# Patient Record
Sex: FEMALE | Race: BLACK OR AFRICAN AMERICAN | NOT HISPANIC OR LATINO | Employment: FULL TIME | URBAN - METROPOLITAN AREA
[De-identification: names, ages, dates, MRNs, and addresses within clinical notes are randomized per-mention and may not be internally consistent; named-entity substitution may affect disease eponyms.]

---

## 2023-09-28 LAB
CHLAMYDIA TRACH., AMPLIFIED: NEGATIVE
N. GONORRHEA, AMPLIFIED: NEGATIVE
TRICHOMONAS VAGINALIS: NEGATIVE
URINE CULTURE: NORMAL

## 2023-10-07 ENCOUNTER — HOSPITAL ENCOUNTER (EMERGENCY)
Facility: HOSPITAL | Age: 33
Discharge: HOME | End: 2023-10-07
Payer: COMMERCIAL

## 2023-10-07 ENCOUNTER — APPOINTMENT (OUTPATIENT)
Dept: RADIOLOGY | Facility: HOSPITAL | Age: 33
End: 2023-10-07
Payer: COMMERCIAL

## 2023-10-07 VITALS
BODY MASS INDEX: 27.97 KG/M2 | DIASTOLIC BLOOD PRESSURE: 96 MMHG | SYSTOLIC BLOOD PRESSURE: 142 MMHG | HEIGHT: 62 IN | HEART RATE: 88 BPM | OXYGEN SATURATION: 98 % | TEMPERATURE: 98.4 F | WEIGHT: 152 LBS | RESPIRATION RATE: 18 BRPM

## 2023-10-07 DIAGNOSIS — S92.355A NONDISPLACED FRACTURE OF FIFTH METATARSAL BONE, LEFT FOOT, INITIAL ENCOUNTER FOR CLOSED FRACTURE: Primary | ICD-10-CM

## 2023-10-07 PROCEDURE — 73630 X-RAY EXAM OF FOOT: CPT | Mod: RIGHT SIDE | Performed by: RADIOLOGY

## 2023-10-07 PROCEDURE — 73630 X-RAY EXAM OF FOOT: CPT | Mod: RT,FY

## 2023-10-07 PROCEDURE — 99283 EMERGENCY DEPT VISIT LOW MDM: CPT

## 2023-10-07 RX ORDER — ACETAMINOPHEN 325 MG/1
650 TABLET ORAL ONCE
Status: COMPLETED | OUTPATIENT
Start: 2023-10-07 | End: 2023-10-07

## 2023-10-07 RX ADMIN — ACETAMINOPHEN 650 MG: 325 TABLET ORAL at 12:43

## 2023-10-07 ASSESSMENT — PAIN SCALES - GENERAL: PAINLEVEL_OUTOF10: 7

## 2023-10-07 ASSESSMENT — PAIN - FUNCTIONAL ASSESSMENT: PAIN_FUNCTIONAL_ASSESSMENT: 0-10

## 2023-10-07 ASSESSMENT — COLUMBIA-SUICIDE SEVERITY RATING SCALE - C-SSRS
6. HAVE YOU EVER DONE ANYTHING, STARTED TO DO ANYTHING, OR PREPARED TO DO ANYTHING TO END YOUR LIFE?: NO
1. IN THE PAST MONTH, HAVE YOU WISHED YOU WERE DEAD OR WISHED YOU COULD GO TO SLEEP AND NOT WAKE UP?: NO
2. HAVE YOU ACTUALLY HAD ANY THOUGHTS OF KILLING YOURSELF?: NO

## 2023-10-07 ASSESSMENT — PAIN DESCRIPTION - LOCATION: LOCATION: FOOT

## 2023-10-07 ASSESSMENT — PAIN DESCRIPTION - PAIN TYPE: TYPE: ACUTE PAIN

## 2023-10-07 NOTE — ED PROVIDER NOTES
33-year-old female injured her right foot 2 days ago.  Missed a step causing inversion injury.  Denies ankle pain.  Indicates pain mid distal lateral foot.  Pain with palpation movement and weightbearing.  No other complaints of pain or injury.  Patient is 14 weeks pregnant.  No abdominal pelvic pain.  No vaginal bleeding.  No other concerns of injury.    PMHX: HPI/EMR reviewed past medical history medications allergies.    REVIEW OF SYSTEMS:.  Constitutional: No other complaints of acute illness, No fever, chills  Musculoskeletal: As stated per history of present illness. Otherwise no other extremity pain, joint pain, joint swelling  Neurological: As stated per history of present illness. Otherwise No headache. No Syncope, No acute visual changes. No difficulty swallowing or speaking. No hemiparesis or paresthesias.  Integumentary: As stated per history of present illness, otherwise no open wounds, bleeding, bruising, redness, or rash.   Review of systems is otherwise negative unless stated above or in history of present illness.    Focused exam: Right lower leg  Knee: Uninjured full range of motion nontender  Tibia: No long bone tenderness or deformity.  No soft tissue swelling.  Ankle: No bony tenderness swelling.  No effusion.  Joint stable.  Full range of motion pain.  No pain with weightbearing.  Foot: Tender lateral mid distal foot fourth and fifth metatarsal area.  No deformity.  No medial tenderness.  Other toes are uninjured.  Skin: Intact no erythema or increased warmth.    Vascular: Neurovascular intact distally  Neurological: No motor sensory deficits.    General exam:  Vitals noted. No distress. Afebrile  Constitutional: Well appearing, well nourished, awake, alert, oriented to person, place, time/situation and in no apparent distress.  Cardiovascular:  Blood pressure noted. Normal heart rate  Respiratory: No respiratory distress. Easy unlabored respirations. Speaks easily and clearly in full  sentences  Neurological: Normal mental status, alert and oriented x3, coherent and appropriate, no motor deficits.  Psychiatric: Appropriate mood and affect. Judgement and insight appropriate    MDM: Radiology report reviewed.  Nondisplaced fracture distal fifth metatarsal.  Patient placed in a padded Ace postop shoe and on crutches.  Normal neurovascular exam.  Stable for discharge outpatient management.  Referrals/orthopedics podiatry.  May use Tylenol for pain.  Ice elevate.  Crutches with nonweightbearing.    Disclaimer: This note was dictated by speech recognition. An attempt at proof reading was made to minimize errors. Errors in transcription may be present.  Please call if questions.     Demetrio Justin PA-C  10/07/23 [id number]

## 2023-10-14 PROBLEM — A59.9 TRICHOMONAS INFECTION: Status: ACTIVE | Noted: 2023-10-14

## 2023-10-14 PROBLEM — A59.01 TRICHOMONAS VAGINITIS: Status: ACTIVE | Noted: 2023-10-14

## 2023-10-14 PROBLEM — O09.32 INSUFFICIENT PRENATAL CARE IN SECOND TRIMESTER (HHS-HCC): Status: ACTIVE | Noted: 2023-10-14

## 2023-10-14 PROBLEM — N89.8 VAGINAL DISCHARGE: Status: ACTIVE | Noted: 2023-10-14

## 2023-10-14 PROBLEM — B96.89 BACTERIAL VAGINOSIS: Status: ACTIVE | Noted: 2023-10-14

## 2023-10-14 PROBLEM — O13.9 GESTATIONAL HYPERTENSION (HHS-HCC): Status: ACTIVE | Noted: 2023-10-14

## 2023-10-14 PROBLEM — R30.0 DYSURIA: Status: ACTIVE | Noted: 2023-10-14

## 2023-10-14 PROBLEM — B37.31 YEAST VAGINITIS: Status: ACTIVE | Noted: 2023-10-14

## 2023-10-14 PROBLEM — N76.0 BACTERIAL VAGINOSIS: Status: ACTIVE | Noted: 2023-10-14

## 2023-10-14 RX ORDER — TRIAMCINOLONE ACETONIDE 1 MG/G
CREAM TOPICAL
COMMUNITY
Start: 2017-08-10 | End: 2023-12-04 | Stop reason: WASHOUT

## 2023-10-14 RX ORDER — IBUPROFEN 600 MG/1
TABLET ORAL
COMMUNITY
Start: 2022-06-17 | End: 2023-11-20 | Stop reason: ALTCHOICE

## 2023-10-14 RX ORDER — DESOXIMETASONE 0.5 MG/G
OINTMENT TOPICAL
COMMUNITY
Start: 2017-08-10 | End: 2024-04-04 | Stop reason: ALTCHOICE

## 2023-10-14 RX ORDER — ASPIRIN 81 MG/1
81 TABLET ORAL DAILY
COMMUNITY
End: 2023-11-20 | Stop reason: ALTCHOICE

## 2023-10-14 RX ORDER — ACETAMINOPHEN 325 MG/1
3 TABLET ORAL EVERY 8 HOURS
COMMUNITY
Start: 2022-06-17 | End: 2024-03-12 | Stop reason: HOSPADM

## 2023-10-14 RX ORDER — NIFEDIPINE 30 MG/1
1 TABLET, FILM COATED, EXTENDED RELEASE ORAL DAILY
COMMUNITY
Start: 2022-06-22 | End: 2023-11-20 | Stop reason: ALTCHOICE

## 2023-10-14 RX ORDER — PRENATAL VIT NO.126/IRON/FOLIC 28MG-0.8MG
1 TABLET ORAL DAILY
COMMUNITY
End: 2023-11-20 | Stop reason: SINTOL

## 2023-10-14 RX ORDER — TRAMADOL HYDROCHLORIDE 50 MG/1
TABLET ORAL
COMMUNITY
Start: 2022-06-17 | End: 2023-12-04 | Stop reason: WASHOUT

## 2023-10-14 RX ORDER — ACETAMINOPHEN 500 MG
1000 TABLET ORAL EVERY 6 HOURS
COMMUNITY
Start: 2022-06-24 | End: 2024-03-12 | Stop reason: HOSPADM

## 2023-10-14 RX ORDER — DOCUSATE SODIUM 100 MG/1
CAPSULE, LIQUID FILLED ORAL
COMMUNITY
Start: 2022-06-17 | End: 2024-03-12 | Stop reason: HOSPADM

## 2023-11-13 ENCOUNTER — HOSPITAL ENCOUNTER (OUTPATIENT)
Dept: RADIOLOGY | Facility: HOSPITAL | Age: 33
Discharge: HOME | End: 2023-11-13
Payer: COMMERCIAL

## 2023-11-13 DIAGNOSIS — Z3A.20 20 WEEKS GESTATION OF PREGNANCY (HHS-HCC): ICD-10-CM

## 2023-11-13 PROCEDURE — 76811 OB US DETAILED SNGL FETUS: CPT | Performed by: OBSTETRICS & GYNECOLOGY

## 2023-11-13 PROCEDURE — 76811 OB US DETAILED SNGL FETUS: CPT

## 2023-11-20 ENCOUNTER — LAB (OUTPATIENT)
Dept: LAB | Facility: LAB | Age: 33
End: 2023-11-20
Payer: COMMERCIAL

## 2023-11-20 ENCOUNTER — ROUTINE PRENATAL (OUTPATIENT)
Dept: OBSTETRICS AND GYNECOLOGY | Facility: CLINIC | Age: 33
End: 2023-11-20
Payer: COMMERCIAL

## 2023-11-20 VITALS — WEIGHT: 151 LBS | DIASTOLIC BLOOD PRESSURE: 95 MMHG | BODY MASS INDEX: 27.62 KG/M2 | SYSTOLIC BLOOD PRESSURE: 145 MMHG

## 2023-11-20 DIAGNOSIS — Z34.92 PRENATAL CARE IN SECOND TRIMESTER (HHS-HCC): ICD-10-CM

## 2023-11-20 DIAGNOSIS — O10.919 CHRONIC HYPERTENSION AFFECTING PREGNANCY (HHS-HCC): ICD-10-CM

## 2023-11-20 DIAGNOSIS — N85.A UTERINE SCAR FROM PREVIOUS CESAREAN DELIVERY: ICD-10-CM

## 2023-11-20 DIAGNOSIS — O13.2 GESTATIONAL HYPERTENSION, SECOND TRIMESTER (HHS-HCC): Primary | ICD-10-CM

## 2023-11-20 PROBLEM — B96.89 BACTERIAL VAGINOSIS: Status: RESOLVED | Noted: 2023-10-14 | Resolved: 2023-11-20

## 2023-11-20 PROBLEM — B37.31 YEAST VAGINITIS: Status: RESOLVED | Noted: 2023-10-14 | Resolved: 2023-11-20

## 2023-11-20 PROBLEM — A59.9 TRICHOMONAS INFECTION: Status: RESOLVED | Noted: 2023-10-14 | Resolved: 2023-11-20

## 2023-11-20 PROBLEM — N76.0 BACTERIAL VAGINOSIS: Status: RESOLVED | Noted: 2023-10-14 | Resolved: 2023-11-20

## 2023-11-20 LAB
ABO GROUP (TYPE) IN BLOOD: NORMAL
ALBUMIN SERPL BCP-MCNC: 3.9 G/DL (ref 3.4–5)
ALP SERPL-CCNC: 60 U/L (ref 33–110)
ALT SERPL W P-5'-P-CCNC: 12 U/L (ref 7–45)
ANION GAP SERPL CALC-SCNC: 10 MMOL/L (ref 10–20)
ANTIBODY SCREEN: NORMAL
AST SERPL W P-5'-P-CCNC: 14 U/L (ref 9–39)
BILIRUB SERPL-MCNC: 0.3 MG/DL (ref 0–1.2)
BUN SERPL-MCNC: 6 MG/DL (ref 6–23)
CALCIUM SERPL-MCNC: 8.6 MG/DL (ref 8.6–10.3)
CHLORIDE SERPL-SCNC: 104 MMOL/L (ref 98–107)
CO2 SERPL-SCNC: 24 MMOL/L (ref 21–32)
CREAT SERPL-MCNC: 0.47 MG/DL (ref 0.5–1.05)
CREAT UR-MCNC: 137.5 MG/DL (ref 20–320)
ERYTHROCYTE [DISTWIDTH] IN BLOOD BY AUTOMATED COUNT: 13.3 % (ref 11.5–14.5)
EST. AVERAGE GLUCOSE BLD GHB EST-MCNC: 88 MG/DL
GFR SERPL CREATININE-BSD FRML MDRD: >90 ML/MIN/1.73M*2
GLUCOSE SERPL-MCNC: 72 MG/DL (ref 74–99)
HBA1C MFR BLD: 4.7 %
HBV SURFACE AG SERPL QL IA: NONREACTIVE
HCT VFR BLD AUTO: 37.3 % (ref 36–46)
HCV AB SER QL: NONREACTIVE
HGB BLD-MCNC: 12.5 G/DL (ref 12–16)
HIV 1+2 AB+HIV1 P24 AG SERPL QL IA: NONREACTIVE
MCH RBC QN AUTO: 29.3 PG (ref 26–34)
MCHC RBC AUTO-ENTMCNC: 33.5 G/DL (ref 32–36)
MCV RBC AUTO: 88 FL (ref 80–100)
NRBC BLD-RTO: 0 /100 WBCS (ref 0–0)
PLATELET # BLD AUTO: 265 X10*3/UL (ref 150–450)
POC APPEARANCE, URINE: CLEAR
POC BILIRUBIN, URINE: NEGATIVE
POC BLOOD, URINE: NEGATIVE
POC COLOR, URINE: YELLOW
POC GLUCOSE, URINE: NEGATIVE MG/DL
POC KETONES, URINE: NEGATIVE MG/DL
POC LEUKOCYTES, URINE: NEGATIVE
POC NITRITE,URINE: NEGATIVE
POC PH, URINE: 7.5 PH
POC PROTEIN, URINE: ABNORMAL MG/DL
POC SPECIFIC GRAVITY, URINE: 1.02
POC UROBILINOGEN, URINE: 0.2 EU/DL
POTASSIUM SERPL-SCNC: 3.7 MMOL/L (ref 3.5–5.3)
PROT SERPL-MCNC: 6.8 G/DL (ref 6.4–8.2)
PROT UR-ACNC: 15 MG/DL (ref 5–24)
PROT/CREAT UR: 0.11 MG/MG CREAT (ref 0–0.17)
RBC # BLD AUTO: 4.26 X10*6/UL (ref 4–5.2)
RH FACTOR (ANTIGEN D): NORMAL
RUBV IGG SERPL IA-ACNC: 2.4 IA
RUBV IGG SERPL QL IA: POSITIVE
SODIUM SERPL-SCNC: 134 MMOL/L (ref 136–145)
T PALLIDUM AB SER QL: NONREACTIVE
WBC # BLD AUTO: 11.6 X10*3/UL (ref 4.4–11.3)

## 2023-11-20 PROCEDURE — 86900 BLOOD TYPING SEROLOGIC ABO: CPT

## 2023-11-20 PROCEDURE — 82570 ASSAY OF URINE CREATININE: CPT | Performed by: OBSTETRICS & GYNECOLOGY

## 2023-11-20 PROCEDURE — 86850 RBC ANTIBODY SCREEN: CPT

## 2023-11-20 PROCEDURE — 86780 TREPONEMA PALLIDUM: CPT

## 2023-11-20 PROCEDURE — 87340 HEPATITIS B SURFACE AG IA: CPT

## 2023-11-20 PROCEDURE — 87389 HIV-1 AG W/HIV-1&-2 AB AG IA: CPT

## 2023-11-20 PROCEDURE — 99214 OFFICE O/P EST MOD 30 MIN: CPT | Mod: TH | Performed by: OBSTETRICS & GYNECOLOGY

## 2023-11-20 PROCEDURE — 86317 IMMUNOASSAY INFECTIOUS AGENT: CPT

## 2023-11-20 PROCEDURE — 81329 SMN1 GENE DOS/DELETION ALYS: CPT

## 2023-11-20 PROCEDURE — 36415 COLL VENOUS BLD VENIPUNCTURE: CPT

## 2023-11-20 PROCEDURE — 86901 BLOOD TYPING SEROLOGIC RH(D): CPT

## 2023-11-20 PROCEDURE — 80053 COMPREHEN METABOLIC PANEL: CPT

## 2023-11-20 PROCEDURE — 83036 HEMOGLOBIN GLYCOSYLATED A1C: CPT

## 2023-11-20 PROCEDURE — 85027 COMPLETE CBC AUTOMATED: CPT

## 2023-11-20 PROCEDURE — 81220 CFTR GENE COM VARIANTS: CPT

## 2023-11-20 PROCEDURE — 86803 HEPATITIS C AB TEST: CPT

## 2023-11-20 PROCEDURE — 99214 OFFICE O/P EST MOD 30 MIN: CPT | Performed by: OBSTETRICS & GYNECOLOGY

## 2023-11-20 RX ORDER — PYRIDOXINE HCL (VITAMIN B6) 25 MG
25 TABLET ORAL EVERY 8 HOURS
Qty: 270 TABLET | Refills: 0 | Status: SHIPPED | OUTPATIENT
Start: 2023-11-20 | End: 2024-02-18

## 2023-11-20 RX ORDER — NIFEDIPINE 30 MG/1
30 TABLET, FILM COATED, EXTENDED RELEASE ORAL
Qty: 30 TABLET | Refills: 11 | Status: SHIPPED | OUTPATIENT
Start: 2023-11-20 | End: 2024-03-12 | Stop reason: HOSPADM

## 2023-11-20 RX ORDER — NAPROXEN SODIUM 220 MG/1
162 TABLET, FILM COATED ORAL DAILY
Qty: 180 TABLET | Refills: 3 | Status: SHIPPED | OUTPATIENT
Start: 2023-11-20 | End: 2024-03-12 | Stop reason: HOSPADM

## 2023-11-20 RX ORDER — PRENATAL VIT NO.126/IRON/FOLIC 28MG-0.8MG
1 TABLET ORAL DAILY
Qty: 90 TABLET | Refills: 3 | Status: SHIPPED | OUTPATIENT
Start: 2023-11-20 | End: 2024-04-19 | Stop reason: SDUPTHER

## 2023-11-20 NOTE — PROGRESS NOTES
Needs PNL labs drawn  Suspect CHTN based on elevated BP today and prior hx PP HTN that resolved 2 months postpartum. Initial BP today 160 systolics, repeat 140s.   - Draw baseline labs. Tr prot on UA today.   - Start Nifed 30mg daily  - bASA 162mg daily, only taking consistently the past 2-3 weeks due to NV earlier in the pregnancy  - Home BP cuff already possessed, check BID, goal <140/90  - Serial growths at 28 weeks   - Weekly NST at 32 weeks  Prior CS: wants TOLAC. Recommend [facility] for location given risk for sPEC.  NVP: only now starting to improve, would like B6    Undecided re: contraception.    Irena Alberto MD

## 2023-11-22 ENCOUNTER — APPOINTMENT (OUTPATIENT)
Dept: PODIATRY | Facility: CLINIC | Age: 33
End: 2023-11-22
Payer: COMMERCIAL

## 2023-11-30 LAB
ELECTRONICALLY SIGNED BY: NORMAL
SMA RESULT: NORMAL

## 2023-12-01 LAB
CFTR MUT ANL BLD/T: NORMAL
ELECTRONICALLY SIGNED BY: NORMAL

## 2023-12-04 ENCOUNTER — ROUTINE PRENATAL (OUTPATIENT)
Dept: OBSTETRICS AND GYNECOLOGY | Facility: CLINIC | Age: 33
End: 2023-12-04
Payer: COMMERCIAL

## 2023-12-04 VITALS — DIASTOLIC BLOOD PRESSURE: 91 MMHG | WEIGHT: 154 LBS | SYSTOLIC BLOOD PRESSURE: 138 MMHG | BODY MASS INDEX: 28.17 KG/M2

## 2023-12-04 DIAGNOSIS — O09.892 SUPERVISION OF OTHER HIGH RISK PREGNANCIES, SECOND TRIMESTER (HHS-HCC): Primary | ICD-10-CM

## 2023-12-04 DIAGNOSIS — Z3A.23 23 WEEKS GESTATION OF PREGNANCY (HHS-HCC): ICD-10-CM

## 2023-12-04 PROCEDURE — 99213 OFFICE O/P EST LOW 20 MIN: CPT | Performed by: ADVANCED PRACTICE MIDWIFE

## 2023-12-04 PROCEDURE — 99213 OFFICE O/P EST LOW 20 MIN: CPT | Mod: TH,SB | Performed by: ADVANCED PRACTICE MIDWIFE

## 2023-12-04 ASSESSMENT — ENCOUNTER SYMPTOMS
NEUROLOGICAL NEGATIVE: 0
PSYCHIATRIC NEGATIVE: 0
ALLERGIC/IMMUNOLOGIC NEGATIVE: 0
ENDOCRINE NEGATIVE: 0
EYES NEGATIVE: 0
GASTROINTESTINAL NEGATIVE: 0
CARDIOVASCULAR NEGATIVE: 0
MUSCULOSKELETAL NEGATIVE: 0
RESPIRATORY NEGATIVE: 0
HEMATOLOGIC/LYMPHATIC NEGATIVE: 0
CONSTITUTIONAL NEGATIVE: 0

## 2023-12-04 NOTE — PROGRESS NOTES
Subjective   Patient ID [id number]   Isela Alvarado is a 33 y.o.  at 23w0d with a working estimated date of delivery of 2024, Date entered prior to episode creation who presents for a routine prenatal visit. She denies vaginal bleeding, leakage of fluid, decreased fetal movements, or contractions.    Has been taking Nifedipine 30mg daily x2 weeks, states she is feeling well on it. Home Bps 130s/70s-80s.    Her pregnancy is complicated by:  -cHTN, on Nifed 30mg  -h/o pltcs for NRFHT remote for delivery, considering TOLAC    Objective   Physical Exam  Weight: 69.9 kg (154 lb)  Expected Total Weight Gain: 7 kg (15 lb)-11.5 kg (25 lb)   Pregravid BMI: 28.16  BP: (!) 138/91         Prenatal Labs  Urine dip:  Lab Results   Component Value Date    KETONESU NEGATIVE 2023       Lab Results   Component Value Date    HGB 12.5 2023    HCT 37.3 2023    ABO O 2023    HEPBSAG Nonreactive 2023        Assessment/Plan       Continue prenatal vitamin.  Labs reviewed.  Continue Nifed and home BP monitoring.   Growth ultrasound next week per anatomy US recommendation. Discussed growth US and weekly  surveillance for cHTN on meds.   Discussed timing for delivery with cHTN.   Follow up in 2 weeks for a routine prenatal visit. Discussed physician management only given anti-hypertensives.

## 2023-12-12 ENCOUNTER — ANCILLARY PROCEDURE (OUTPATIENT)
Dept: RADIOLOGY | Facility: CLINIC | Age: 33
End: 2023-12-12
Payer: COMMERCIAL

## 2023-12-12 ENCOUNTER — ANCILLARY ORDERS (OUTPATIENT)
Dept: OBSTETRICS AND GYNECOLOGY | Facility: CLINIC | Age: 33
End: 2023-12-12

## 2023-12-12 DIAGNOSIS — Z32.01 PREGNANCY TEST POSITIVE (HHS-HCC): Primary | ICD-10-CM

## 2023-12-12 DIAGNOSIS — Z32.01 PREGNANCY TEST POSITIVE (HHS-HCC): ICD-10-CM

## 2023-12-12 PROCEDURE — 76816 OB US FOLLOW-UP PER FETUS: CPT | Performed by: OBSTETRICS & GYNECOLOGY

## 2023-12-12 PROCEDURE — 76820 UMBILICAL ARTERY ECHO: CPT | Performed by: OBSTETRICS & GYNECOLOGY

## 2023-12-12 PROCEDURE — 76820 UMBILICAL ARTERY ECHO: CPT

## 2023-12-12 PROCEDURE — 76816 OB US FOLLOW-UP PER FETUS: CPT

## 2023-12-12 PROCEDURE — 76819 FETAL BIOPHYS PROFIL W/O NST: CPT | Performed by: OBSTETRICS & GYNECOLOGY

## 2023-12-19 ENCOUNTER — ROUTINE PRENATAL (OUTPATIENT)
Dept: OBSTETRICS AND GYNECOLOGY | Facility: CLINIC | Age: 33
End: 2023-12-19
Payer: COMMERCIAL

## 2023-12-19 VITALS — SYSTOLIC BLOOD PRESSURE: 110 MMHG | WEIGHT: 158 LBS | BODY MASS INDEX: 28.9 KG/M2 | DIASTOLIC BLOOD PRESSURE: 80 MMHG

## 2023-12-19 DIAGNOSIS — N85.A UTERINE SCAR FROM PREVIOUS CESAREAN DELIVERY: Primary | ICD-10-CM

## 2023-12-19 DIAGNOSIS — O10.919 CHRONIC HYPERTENSION AFFECTING PREGNANCY (HHS-HCC): ICD-10-CM

## 2023-12-19 PROCEDURE — 99213 OFFICE O/P EST LOW 20 MIN: CPT | Performed by: OBSTETRICS & GYNECOLOGY

## 2023-12-19 RX ORDER — FOLIC ACID 0.4 MG
0.4 TABLET ORAL DAILY
COMMUNITY
Start: 2023-09-26

## 2023-12-19 NOTE — PROGRESS NOTES
"Subjective   Patient ID [id number]   Isela Alvarado is a 33 y.o.  at 25w1d with a working estimated date of delivery of 2024, Date entered prior to episode creation who presents for a routine prenatal visit. She denies vaginal bleeding, leakage of fluid, decreased fetal movements, or contractions.    Her pregnancy is complicated by:  Gest htn, FGR    Objective   Physical Exam  Weight: 71.7 kg (158 lb)  Expected Total Weight Gain: 7 kg (15 lb)-11.5 kg (25 lb)   Pregravid BMI: 28.16  BP: 110/80  Fetal Heart Rate: 133 Fundal Height (cm): 25 cm    Prenatal Labs  Urine dip:  Lab Results   Component Value Date    KETONESU NEGATIVE 2023       Lab Results   Component Value Date    HGB 12.5 2023    HCT 37.3 2023    ABO O 2023    HEPBSAG Nonreactive 2023     No results found for: \"PAPPA\", \"AFP\", \"HCG\", \"ESTRIOL\", \"INHBA\"  No results found for: \"GLUF\", \"GLUT1\", \"JRGIBVM4LY\", \"KLWDBUO6OB\"    Imaging  The most recent ultrasound was performed on The most recent ultrasound study is not finalized with a study GA of The most recent ultrasound study is not finalized and EFW of The most recent ultrasound study is not finalized.  The most recent ultrasound study is not finalized  The most recent ultrasound study is not finalized    Assessment/Plan   Problem List Items Addressed This Visit             ICD-10-CM    Uterine scar from previous  delivery - Primary N85.A    Chronic hypertension affecting pregnancy O10.919       Continue prenatal vitamin.  Labs reviewed.  .  Follow up in 2 weeks for a routine prenatal visit.  "

## 2023-12-20 ENCOUNTER — ANCILLARY PROCEDURE (OUTPATIENT)
Dept: RADIOLOGY | Facility: CLINIC | Age: 33
End: 2023-12-20
Payer: COMMERCIAL

## 2023-12-20 ENCOUNTER — APPOINTMENT (OUTPATIENT)
Dept: RADIOLOGY | Facility: HOSPITAL | Age: 33
End: 2023-12-20
Payer: COMMERCIAL

## 2023-12-20 ENCOUNTER — HOSPITAL ENCOUNTER (EMERGENCY)
Facility: HOSPITAL | Age: 33
Discharge: HOME | End: 2023-12-20
Payer: COMMERCIAL

## 2023-12-20 ENCOUNTER — APPOINTMENT (OUTPATIENT)
Dept: RADIOLOGY | Facility: CLINIC | Age: 33
End: 2023-12-20
Payer: COMMERCIAL

## 2023-12-20 VITALS
HEART RATE: 101 BPM | RESPIRATION RATE: 17 BRPM | TEMPERATURE: 97.8 F | BODY MASS INDEX: 29.26 KG/M2 | SYSTOLIC BLOOD PRESSURE: 143 MMHG | WEIGHT: 159 LBS | DIASTOLIC BLOOD PRESSURE: 98 MMHG | OXYGEN SATURATION: 99 % | HEIGHT: 62 IN

## 2023-12-20 DIAGNOSIS — Z32.01 PREGNANCY TEST POSITIVE (HHS-HCC): ICD-10-CM

## 2023-12-20 DIAGNOSIS — E04.1 THYROID NODULE: Primary | ICD-10-CM

## 2023-12-20 DIAGNOSIS — R07.89 PAIN, CHEST WALL: ICD-10-CM

## 2023-12-20 DIAGNOSIS — O09.90 SUPERVISION OF HIGH RISK PREGNANCY, UNSPECIFIED, UNSPECIFIED TRIMESTER (HHS-HCC): ICD-10-CM

## 2023-12-20 DIAGNOSIS — M62.838 NECK MUSCLE SPASM: ICD-10-CM

## 2023-12-20 PROCEDURE — 71045 X-RAY EXAM CHEST 1 VIEW: CPT | Performed by: STUDENT IN AN ORGANIZED HEALTH CARE EDUCATION/TRAINING PROGRAM

## 2023-12-20 PROCEDURE — 76820 UMBILICAL ARTERY ECHO: CPT | Performed by: OBSTETRICS & GYNECOLOGY

## 2023-12-20 PROCEDURE — 76819 FETAL BIOPHYS PROFIL W/O NST: CPT | Performed by: OBSTETRICS & GYNECOLOGY

## 2023-12-20 PROCEDURE — 76820 UMBILICAL ARTERY ECHO: CPT

## 2023-12-20 PROCEDURE — 76819 FETAL BIOPHYS PROFIL W/O NST: CPT

## 2023-12-20 PROCEDURE — 99284 EMERGENCY DEPT VISIT MOD MDM: CPT | Mod: 25 | Performed by: NURSE PRACTITIONER

## 2023-12-20 PROCEDURE — 72125 CT NECK SPINE W/O DYE: CPT | Performed by: STUDENT IN AN ORGANIZED HEALTH CARE EDUCATION/TRAINING PROGRAM

## 2023-12-20 PROCEDURE — 2500000005 HC RX 250 GENERAL PHARMACY W/O HCPCS: Performed by: NURSE PRACTITIONER

## 2023-12-20 PROCEDURE — 99284 EMERGENCY DEPT VISIT MOD MDM: CPT

## 2023-12-20 PROCEDURE — 71045 X-RAY EXAM CHEST 1 VIEW: CPT

## 2023-12-20 PROCEDURE — 72125 CT NECK SPINE W/O DYE: CPT

## 2023-12-20 RX ORDER — LIDOCAINE 560 MG/1
1 PATCH PERCUTANEOUS; TOPICAL; TRANSDERMAL ONCE
Status: DISCONTINUED | OUTPATIENT
Start: 2023-12-20 | End: 2023-12-20 | Stop reason: HOSPADM

## 2023-12-20 RX ORDER — LIDOCAINE 50 MG/G
1 PATCH TOPICAL DAILY
Qty: 7 PATCH | Refills: 0 | Status: SHIPPED | OUTPATIENT
Start: 2023-12-20 | End: 2023-12-27

## 2023-12-20 RX ORDER — ACETAMINOPHEN 500 MG
1000 TABLET ORAL 2 TIMES DAILY
Qty: 28 TABLET | Refills: 0 | Status: SHIPPED | OUTPATIENT
Start: 2023-12-20 | End: 2023-12-27

## 2023-12-20 RX ORDER — ACETAMINOPHEN 325 MG/1
TABLET ORAL
Status: COMPLETED
Start: 2023-12-20 | End: 2023-12-20

## 2023-12-20 RX ORDER — ACETAMINOPHEN 325 MG/1
975 TABLET ORAL ONCE
Status: COMPLETED | OUTPATIENT
Start: 2023-12-20 | End: 2023-12-20

## 2023-12-20 RX ADMIN — LIDOCAINE 1 PATCH: 4 PATCH TOPICAL at 19:03

## 2023-12-20 RX ADMIN — ACETAMINOPHEN 975 MG: 325 TABLET ORAL at 19:03

## 2023-12-20 ASSESSMENT — COLUMBIA-SUICIDE SEVERITY RATING SCALE - C-SSRS
2. HAVE YOU ACTUALLY HAD ANY THOUGHTS OF KILLING YOURSELF?: NO
1. IN THE PAST MONTH, HAVE YOU WISHED YOU WERE DEAD OR WISHED YOU COULD GO TO SLEEP AND NOT WAKE UP?: NO
6. HAVE YOU EVER DONE ANYTHING, STARTED TO DO ANYTHING, OR PREPARED TO DO ANYTHING TO END YOUR LIFE?: NO

## 2023-12-20 ASSESSMENT — PAIN SCALES - GENERAL: PAINLEVEL_OUTOF10: 9

## 2023-12-20 ASSESSMENT — PAIN - FUNCTIONAL ASSESSMENT: PAIN_FUNCTIONAL_ASSESSMENT: 0-10

## 2023-12-20 ASSESSMENT — PAIN DESCRIPTION - LOCATION: LOCATION: NECK
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
## 2023-12-21 PROBLEM — Z36.4 ULTRASOUND FOR ANTENATAL SCREENING FOR FETAL GROWTH RESTRICTION (HHS-HCC): Status: ACTIVE | Noted: 2023-12-21

## 2023-12-26 ENCOUNTER — ANCILLARY PROCEDURE (OUTPATIENT)
Dept: RADIOLOGY | Facility: CLINIC | Age: 33
End: 2023-12-26
Payer: COMMERCIAL

## 2023-12-26 DIAGNOSIS — O36.5990 IUGR (INTRAUTERINE GROWTH RESTRICTION) AFFECTING CARE OF MOTHER (HHS-HCC): ICD-10-CM

## 2023-12-26 DIAGNOSIS — Z32.01 PREGNANCY TEST POSITIVE (HHS-HCC): ICD-10-CM

## 2023-12-26 PROCEDURE — 76820 UMBILICAL ARTERY ECHO: CPT | Performed by: OBSTETRICS & GYNECOLOGY

## 2023-12-26 PROCEDURE — 76819 FETAL BIOPHYS PROFIL W/O NST: CPT | Performed by: OBSTETRICS & GYNECOLOGY

## 2023-12-26 PROCEDURE — 76820 UMBILICAL ARTERY ECHO: CPT

## 2023-12-26 PROCEDURE — 76819 FETAL BIOPHYS PROFIL W/O NST: CPT

## 2024-01-02 ENCOUNTER — TELEPHONE (OUTPATIENT)
Dept: OBSTETRICS AND GYNECOLOGY | Facility: CLINIC | Age: 34
End: 2024-01-02

## 2024-01-02 ENCOUNTER — ROUTINE PRENATAL (OUTPATIENT)
Dept: OBSTETRICS AND GYNECOLOGY | Facility: CLINIC | Age: 34
End: 2024-01-02
Payer: COMMERCIAL

## 2024-01-02 ENCOUNTER — LAB (OUTPATIENT)
Dept: LAB | Facility: LAB | Age: 34
End: 2024-01-02
Payer: COMMERCIAL

## 2024-01-02 VITALS — BODY MASS INDEX: 29.62 KG/M2 | DIASTOLIC BLOOD PRESSURE: 90 MMHG | WEIGHT: 163 LBS | SYSTOLIC BLOOD PRESSURE: 142 MMHG

## 2024-01-02 DIAGNOSIS — Z3A.27 27 WEEKS GESTATION OF PREGNANCY (HHS-HCC): ICD-10-CM

## 2024-01-02 DIAGNOSIS — Z36.4 ULTRASOUND FOR ANTENATAL SCREENING FOR FETAL GROWTH RESTRICTION (HHS-HCC): ICD-10-CM

## 2024-01-02 DIAGNOSIS — O10.919 CHRONIC HYPERTENSION AFFECTING PREGNANCY (HHS-HCC): Primary | ICD-10-CM

## 2024-01-02 DIAGNOSIS — O10.919 CHRONIC HYPERTENSION AFFECTING PREGNANCY (HHS-HCC): ICD-10-CM

## 2024-01-02 LAB
ERYTHROCYTE [DISTWIDTH] IN BLOOD BY AUTOMATED COUNT: 13.2 % (ref 11.5–14.5)
GLUCOSE 1H P 50 G GLC PO SERPL-MCNC: 134 MG/DL
HCT VFR BLD AUTO: 36.8 % (ref 36–46)
HGB BLD-MCNC: 12.3 G/DL (ref 12–16)
MCH RBC QN AUTO: 29.8 PG (ref 26–34)
MCHC RBC AUTO-ENTMCNC: 33.4 G/DL (ref 32–36)
MCV RBC AUTO: 89 FL (ref 80–100)
NRBC BLD-RTO: 0 /100 WBCS (ref 0–0)
PLATELET # BLD AUTO: 269 X10*3/UL (ref 150–450)
RBC # BLD AUTO: 4.13 X10*6/UL (ref 4–5.2)
T PALLIDUM AB SER QL: NONREACTIVE
WBC # BLD AUTO: 12.3 X10*3/UL (ref 4.4–11.3)

## 2024-01-02 PROCEDURE — 86780 TREPONEMA PALLIDUM: CPT

## 2024-01-02 PROCEDURE — 85027 COMPLETE CBC AUTOMATED: CPT

## 2024-01-02 PROCEDURE — 99213 OFFICE O/P EST LOW 20 MIN: CPT | Performed by: OBSTETRICS & GYNECOLOGY

## 2024-01-02 PROCEDURE — 36415 COLL VENOUS BLD VENIPUNCTURE: CPT

## 2024-01-02 PROCEDURE — 82947 ASSAY GLUCOSE BLOOD QUANT: CPT

## 2024-01-02 NOTE — PROGRESS NOTES
"Subjective   Patient ID [id number]   Isela Alvarado is a 33 y.o.  at 27w1d with a working estimated date of delivery of 2024, Date entered prior to episode creation who presents for a routine prenatal visit. She denies vaginal bleeding, leakage of fluid, decreased fetal movements, or contractions.    Her pregnancy is complicated by:  Chronic htn, FGR    Objective   Physical Exam:   Weight: 73.9 kg (163 lb)  Expected Total Weight Gain: 7 kg (15 lb)-11.5 kg (25 lb)   Pregravid BMI: 27.98  BP: 142/90  Fetal Heart Rate: 128 Fundal Height (cm): 28 cm             Prenatal Labs  Urine Dip:  Lab Results   Component Value Date    KETONESU NEGATIVE 2023     Lab Results   Component Value Date    HGB 12.5 2023    HCT 37.3 2023    ABO O 2023    HEPBSAG Nonreactive 2023     No results found for: \"PAPPA\", \"AFP\", \"HCG\", \"ESTRIOL\", \"INHBA\"  No results found for: \"GLUF\", \"GLUT1\", \"NATWTWT0OG\", \"[id number]\"    Imaging  The most recent ultrasound was performed on   with a study GA of   and EFW of  .          Assessment/Plan   Problem List Items Addressed This Visit             ICD-10-CM    Chronic hypertension affecting pregnancy - Primary O10.919    Relevant Orders    CBC    Glucose, 1 Hour Screen, Pregnancy    Syphilis Screen with Reflex    Ultrasound for  screening for fetal growth restriction Z36.4    Relevant Orders    CBC    Glucose, 1 Hour Screen, Pregnancy    Syphilis Screen with Reflex     Other Visit Diagnoses         Codes    27 weeks gestation of pregnancy     Z3A.27    Relevant Orders    CBC    Glucose, 1 Hour Screen, Pregnancy    Syphilis Screen with Reflex          Continue prenatal vitamin.  Labs reviewed.  GBS taken.  Expected mode of delivery vag  Follow up in 1 week for a routine prenatal visit.  "

## 2024-01-03 ENCOUNTER — APPOINTMENT (OUTPATIENT)
Dept: RADIOLOGY | Facility: CLINIC | Age: 34
End: 2024-01-03
Payer: COMMERCIAL

## 2024-01-03 ENCOUNTER — TELEPHONE (OUTPATIENT)
Dept: OBSTETRICS AND GYNECOLOGY | Facility: CLINIC | Age: 34
End: 2024-01-03
Payer: COMMERCIAL

## 2024-01-03 NOTE — TELEPHONE ENCOUNTER
Pt verified by name and .  Pt calling states she is pregnant.  Saw Leelee Morrow back in September was told something about getting a pack and play can car seat.  Pt advised to keep her appt with Dr. Alberto.  Nurse gave pt number for [facility]  Pt has no questions at this time.

## 2024-01-04 ENCOUNTER — ANCILLARY PROCEDURE (OUTPATIENT)
Dept: RADIOLOGY | Facility: CLINIC | Age: 34
End: 2024-01-04
Payer: COMMERCIAL

## 2024-01-04 DIAGNOSIS — Z32.01 PREGNANCY TEST POSITIVE (HHS-HCC): ICD-10-CM

## 2024-01-04 PROCEDURE — 76819 FETAL BIOPHYS PROFIL W/O NST: CPT | Performed by: OBSTETRICS & GYNECOLOGY

## 2024-01-04 PROCEDURE — 76819 FETAL BIOPHYS PROFIL W/O NST: CPT

## 2024-01-04 PROCEDURE — 76820 UMBILICAL ARTERY ECHO: CPT | Performed by: OBSTETRICS & GYNECOLOGY

## 2024-01-04 PROCEDURE — 76816 OB US FOLLOW-UP PER FETUS: CPT

## 2024-01-04 PROCEDURE — 76816 OB US FOLLOW-UP PER FETUS: CPT | Performed by: OBSTETRICS & GYNECOLOGY

## 2024-01-05 ENCOUNTER — APPOINTMENT (OUTPATIENT)
Dept: OTOLARYNGOLOGY | Facility: CLINIC | Age: 34
End: 2024-01-05
Payer: COMMERCIAL

## 2024-01-15 ENCOUNTER — ROUTINE PRENATAL (OUTPATIENT)
Dept: OBSTETRICS AND GYNECOLOGY | Facility: CLINIC | Age: 34
End: 2024-01-15
Payer: COMMERCIAL

## 2024-01-15 ENCOUNTER — APPOINTMENT (OUTPATIENT)
Dept: OTOLARYNGOLOGY | Facility: CLINIC | Age: 34
End: 2024-01-15
Payer: COMMERCIAL

## 2024-01-15 VITALS — SYSTOLIC BLOOD PRESSURE: 137 MMHG | BODY MASS INDEX: 30.16 KG/M2 | WEIGHT: 166 LBS | DIASTOLIC BLOOD PRESSURE: 86 MMHG

## 2024-01-15 DIAGNOSIS — Z36.4 ULTRASOUND FOR ANTENATAL SCREENING FOR FETAL GROWTH RESTRICTION (HHS-HCC): ICD-10-CM

## 2024-01-15 DIAGNOSIS — N85.A UTERINE SCAR FROM PREVIOUS CESAREAN DELIVERY: Primary | ICD-10-CM

## 2024-01-15 DIAGNOSIS — O10.919 CHRONIC HYPERTENSION AFFECTING PREGNANCY (HHS-HCC): ICD-10-CM

## 2024-01-15 PROCEDURE — 99214 OFFICE O/P EST MOD 30 MIN: CPT | Mod: TH | Performed by: OBSTETRICS & GYNECOLOGY

## 2024-01-15 PROCEDURE — 99214 OFFICE O/P EST MOD 30 MIN: CPT | Performed by: OBSTETRICS & GYNECOLOGY

## 2024-01-15 NOTE — PROGRESS NOTES
Tdap offered. Pt states she had an allergic rxn to the Tdap prior. She will plan to have baby vaccinated.  2nd tri labs passed.  CHTN: BP at goal on Nifed 30mg daily. Plan for del at 38 weeks. For serial growths (next 1/26) and weekly NSTs after 32 weeks.  FGR on 12/12 US (AC 2%, EFW 3%): Nl dopplers thus far. Nl growth on 1/4/24 at EFW 13% and AC 17%. Pt reports next US is at 30 weeks on 1/26.  Prior CS: wants TOLAC, score > 50%.    Needs # for [facility] as [facility] didn't reach out to her yet for pack n play and car seat. Did get breast pump from [employer].     Irena Alberto MD

## 2024-01-26 ENCOUNTER — HOSPITAL ENCOUNTER (OUTPATIENT)
Dept: RADIOLOGY | Facility: CLINIC | Age: 34
Discharge: HOME | End: 2024-01-26
Payer: COMMERCIAL

## 2024-01-26 DIAGNOSIS — Z32.01 PREGNANCY TEST POSITIVE (HHS-HCC): ICD-10-CM

## 2024-01-26 PROCEDURE — 76820 UMBILICAL ARTERY ECHO: CPT | Performed by: OBSTETRICS & GYNECOLOGY

## 2024-01-26 PROCEDURE — 76819 FETAL BIOPHYS PROFIL W/O NST: CPT | Performed by: OBSTETRICS & GYNECOLOGY

## 2024-01-26 PROCEDURE — 76819 FETAL BIOPHYS PROFIL W/O NST: CPT

## 2024-01-26 PROCEDURE — 76816 OB US FOLLOW-UP PER FETUS: CPT

## 2024-01-26 PROCEDURE — 76820 UMBILICAL ARTERY ECHO: CPT

## 2024-01-26 PROCEDURE — 76816 OB US FOLLOW-UP PER FETUS: CPT | Performed by: OBSTETRICS & GYNECOLOGY

## 2024-02-01 ENCOUNTER — APPOINTMENT (OUTPATIENT)
Dept: OBSTETRICS AND GYNECOLOGY | Facility: CLINIC | Age: 34
End: 2024-02-01
Payer: COMMERCIAL

## 2024-02-06 ENCOUNTER — APPOINTMENT (OUTPATIENT)
Dept: OBSTETRICS AND GYNECOLOGY | Facility: CLINIC | Age: 34
End: 2024-02-06
Payer: COMMERCIAL

## 2024-02-06 ENCOUNTER — HOSPITAL ENCOUNTER (OUTPATIENT)
Dept: RADIOLOGY | Facility: CLINIC | Age: 34
Discharge: HOME | End: 2024-02-06
Payer: COMMERCIAL

## 2024-02-06 DIAGNOSIS — Z32.01 PREGNANCY TEST POSITIVE (HHS-HCC): ICD-10-CM

## 2024-02-06 DIAGNOSIS — O36.5930 MATERNAL CARE FOR OTHER KNOWN OR SUSPECTED POOR FETAL GROWTH, THIRD TRIMESTER, NOT APPLICABLE OR UNSPECIFIED (HHS-HCC): ICD-10-CM

## 2024-02-06 PROCEDURE — 76820 UMBILICAL ARTERY ECHO: CPT

## 2024-02-06 PROCEDURE — 76819 FETAL BIOPHYS PROFIL W/O NST: CPT

## 2024-02-06 PROCEDURE — 76820 UMBILICAL ARTERY ECHO: CPT | Performed by: OBSTETRICS & GYNECOLOGY

## 2024-02-06 PROCEDURE — 76819 FETAL BIOPHYS PROFIL W/O NST: CPT | Performed by: OBSTETRICS & GYNECOLOGY

## 2024-02-07 ENCOUNTER — TELEPHONE (OUTPATIENT)
Dept: RADIOLOGY | Facility: CLINIC | Age: 34
End: 2024-02-07
Payer: COMMERCIAL

## 2024-02-07 NOTE — TELEPHONE ENCOUNTER
Called and reminded her of her upcoming appointments for NST and with Dr. Negron. Patient said she was already aware and knows the baby is measuring small and will be at all of her appointments.

## 2024-02-15 ENCOUNTER — ROUTINE PRENATAL (OUTPATIENT)
Dept: OBSTETRICS AND GYNECOLOGY | Facility: CLINIC | Age: 34
End: 2024-02-15
Payer: COMMERCIAL

## 2024-02-15 ENCOUNTER — APPOINTMENT (OUTPATIENT)
Dept: OBSTETRICS AND GYNECOLOGY | Facility: CLINIC | Age: 34
End: 2024-02-15
Payer: COMMERCIAL

## 2024-02-15 VITALS — SYSTOLIC BLOOD PRESSURE: 138 MMHG | DIASTOLIC BLOOD PRESSURE: 92 MMHG | WEIGHT: 173 LBS | BODY MASS INDEX: 31.43 KG/M2

## 2024-02-15 DIAGNOSIS — O36.5990 FETAL GROWTH RESTRICTION ANTEPARTUM (HHS-HCC): Primary | ICD-10-CM

## 2024-02-15 DIAGNOSIS — O10.919 CHRONIC HYPERTENSION AFFECTING PREGNANCY (HHS-HCC): ICD-10-CM

## 2024-02-15 DIAGNOSIS — Z3A.33 33 WEEKS GESTATION OF PREGNANCY (HHS-HCC): ICD-10-CM

## 2024-02-15 DIAGNOSIS — N85.A UTERINE SCAR FROM PREVIOUS CESAREAN DELIVERY: ICD-10-CM

## 2024-02-15 PROCEDURE — 99213 OFFICE O/P EST LOW 20 MIN: CPT | Performed by: OBSTETRICS & GYNECOLOGY

## 2024-02-15 NOTE — PROGRESS NOTES
Subjective   Patient ID [id number]   Isela Alvarado is a 33 y.o.  at 33w3d with a working estimated date of delivery of 2024, Date entered prior to episode creation who presents for a routine prenatal visit. She denies vaginal bleeding, leakage of fluid, decreased fetal movements, or contractions.    Her pregnancy is complicated by:  Fgr, gest htn    Objective   Physical Exam:   Weight: 78.5 kg (173 lb)  Expected Total Weight Gain: 7 kg (15 lb)-11.5 kg (25 lb)   Pregravid BMI: 27.98  BP: (!) 138/92  Fetal Heart Rate: 128 Fundal Height (cm): 32 cm             Prenatal Labs  Urine Dip:  Lab Results   Component Value Date    KETONESU NEGATIVE 2023     Lab Results   Component Value Date    HGB 12.3 2024    HCT 36.8 2024    ABO O 2023    HEPBSAG Nonreactive 2023       Assessment/Plan   Problem List Items Addressed This Visit             ICD-10-CM    Uterine scar from previous  delivery N85.A    Chronic hypertension affecting pregnancy O10.919    Ultrasound for  screening for fetal growth restriction - Primary Z36.4     Other Visit Diagnoses         Codes    33 weeks gestation of pregnancy     Z3A.33          Continue prenatal vitamin.  Labs reviewed.  GBS taken.  Expected mode of delivery vag  Follow up in 1 week for a routine prenatal visit.  Denies ha, bv, or spots

## 2024-02-20 ENCOUNTER — APPOINTMENT (OUTPATIENT)
Dept: OBSTETRICS AND GYNECOLOGY | Facility: CLINIC | Age: 34
End: 2024-02-20
Payer: COMMERCIAL

## 2024-02-22 ENCOUNTER — HOSPITAL ENCOUNTER (OUTPATIENT)
Dept: RADIOLOGY | Facility: CLINIC | Age: 34
Discharge: HOME | End: 2024-02-22
Payer: COMMERCIAL

## 2024-02-22 DIAGNOSIS — Z32.01 PREGNANCY TEST POSITIVE (HHS-HCC): ICD-10-CM

## 2024-02-22 PROCEDURE — 76819 FETAL BIOPHYS PROFIL W/O NST: CPT | Performed by: STUDENT IN AN ORGANIZED HEALTH CARE EDUCATION/TRAINING PROGRAM

## 2024-02-22 PROCEDURE — 76819 FETAL BIOPHYS PROFIL W/O NST: CPT

## 2024-02-22 PROCEDURE — 76820 UMBILICAL ARTERY ECHO: CPT | Performed by: STUDENT IN AN ORGANIZED HEALTH CARE EDUCATION/TRAINING PROGRAM

## 2024-02-22 PROCEDURE — 76816 OB US FOLLOW-UP PER FETUS: CPT

## 2024-02-22 PROCEDURE — 76816 OB US FOLLOW-UP PER FETUS: CPT | Performed by: STUDENT IN AN ORGANIZED HEALTH CARE EDUCATION/TRAINING PROGRAM

## 2024-02-22 PROCEDURE — 76820 UMBILICAL ARTERY ECHO: CPT

## 2024-02-27 ENCOUNTER — APPOINTMENT (OUTPATIENT)
Dept: OBSTETRICS AND GYNECOLOGY | Facility: CLINIC | Age: 34
End: 2024-02-27
Payer: COMMERCIAL

## 2024-02-29 ENCOUNTER — APPOINTMENT (OUTPATIENT)
Dept: OBSTETRICS AND GYNECOLOGY | Facility: CLINIC | Age: 34
End: 2024-02-29
Payer: COMMERCIAL

## 2024-02-29 ENCOUNTER — HOSPITAL ENCOUNTER (OUTPATIENT)
Dept: RADIOLOGY | Facility: CLINIC | Age: 34
Discharge: HOME | End: 2024-02-29
Payer: COMMERCIAL

## 2024-02-29 DIAGNOSIS — O36.5990 IUGR (INTRAUTERINE GROWTH RESTRICTION) AFFECTING CARE OF MOTHER (HHS-HCC): ICD-10-CM

## 2024-02-29 DIAGNOSIS — Z32.01 PREGNANCY TEST POSITIVE (HHS-HCC): ICD-10-CM

## 2024-02-29 PROCEDURE — 76820 UMBILICAL ARTERY ECHO: CPT | Performed by: STUDENT IN AN ORGANIZED HEALTH CARE EDUCATION/TRAINING PROGRAM

## 2024-02-29 PROCEDURE — 76819 FETAL BIOPHYS PROFIL W/O NST: CPT | Performed by: STUDENT IN AN ORGANIZED HEALTH CARE EDUCATION/TRAINING PROGRAM

## 2024-02-29 PROCEDURE — 76819 FETAL BIOPHYS PROFIL W/O NST: CPT

## 2024-02-29 PROCEDURE — 76820 UMBILICAL ARTERY ECHO: CPT

## 2024-03-07 ENCOUNTER — APPOINTMENT (OUTPATIENT)
Dept: RADIOLOGY | Facility: HOSPITAL | Age: 34
End: 2024-03-07
Payer: COMMERCIAL

## 2024-03-07 ENCOUNTER — ROUTINE PRENATAL (OUTPATIENT)
Dept: OBSTETRICS AND GYNECOLOGY | Facility: CLINIC | Age: 34
End: 2024-03-07
Payer: COMMERCIAL

## 2024-03-07 VITALS — DIASTOLIC BLOOD PRESSURE: 90 MMHG | WEIGHT: 183.6 LBS | BODY MASS INDEX: 33.36 KG/M2 | SYSTOLIC BLOOD PRESSURE: 140 MMHG

## 2024-03-07 DIAGNOSIS — O10.919 CHRONIC HYPERTENSION AFFECTING PREGNANCY (HHS-HCC): ICD-10-CM

## 2024-03-07 DIAGNOSIS — N85.A UTERINE SCAR FROM PREVIOUS CESAREAN DELIVERY: Primary | ICD-10-CM

## 2024-03-07 DIAGNOSIS — O36.5990 FETAL GROWTH RESTRICTION ANTEPARTUM (HHS-HCC): ICD-10-CM

## 2024-03-07 DIAGNOSIS — Z3A.36 36 WEEKS GESTATION OF PREGNANCY (HHS-HCC): ICD-10-CM

## 2024-03-07 DIAGNOSIS — Z36.4 ULTRASOUND FOR ANTENATAL SCREENING FOR FETAL GROWTH RESTRICTION (HHS-HCC): ICD-10-CM

## 2024-03-07 PROCEDURE — 99213 OFFICE O/P EST LOW 20 MIN: CPT | Performed by: OBSTETRICS & GYNECOLOGY

## 2024-03-07 PROCEDURE — 87081 CULTURE SCREEN ONLY: CPT

## 2024-03-07 NOTE — PROGRESS NOTES
Subjective   Patient ID [id number]   Isela Alvarado is a 33 y.o.  at 36w3d with a working estimated date of delivery of 2024, Date entered prior to episode creation who presents for a routine prenatal visit. She denies vaginal bleeding, leakage of fluid, decreased fetal movements, or contractions.    Her pregnancy is complicated by:  Prev c/s, FGR, chronic htn    Objective   Physical Exam:   Weight: 83.3 kg (183 lb 9.6 oz)  Expected Total Weight Gain: 7 kg (15 lb)-11.5 kg (25 lb)   Pregravid BMI: 27.98  BP: 140/90  Fetal Heart Rate: 137 Fundal Height (cm): 36 cm             Prenatal Labs  Urine Dip:  Lab Results   Component Value Date    KETONESU NEGATIVE 2023     Lab Results   Component Value Date    HGB 12.3 2024    HCT 36.8 2024    ABO O 2023    HEPBSAG Nonreactive 2023             Assessment/Plan   Problem List Items Addressed This Visit             ICD-10-CM    Uterine scar from previous  delivery - Primary N85.A    Relevant Orders    Group B Streptococcus (GBS) Prenatal Screen, Culture    Chronic hypertension affecting pregnancy O10.919    Relevant Orders    Group B Streptococcus (GBS) Prenatal Screen, Culture    Ultrasound for  screening for fetal growth restriction Z36.4    Relevant Orders    Group B Streptococcus (GBS) Prenatal Screen, Culture     Other Visit Diagnoses         Codes    Fetal growth restriction antepartum     O36.5990    Relevant Orders    Group B Streptococcus (GBS) Prenatal Screen, Culture    36 weeks gestation of pregnancy     Z3A.36    Relevant Orders    Group B Streptococcus (GBS) Prenatal Screen, Culture          Continue prenatal vitamin.  Labs reviewed.  GBS taken.  Expected mode of delivery tolac  Induction set for Monday  Will set up earlier if recommended by MFM  Appt today

## 2024-03-08 ENCOUNTER — ANESTHESIA (OUTPATIENT)
Dept: OBSTETRICS AND GYNECOLOGY | Facility: HOSPITAL | Age: 34
End: 2024-03-08
Payer: COMMERCIAL

## 2024-03-08 ENCOUNTER — HOSPITAL ENCOUNTER (OUTPATIENT)
Dept: RADIOLOGY | Facility: CLINIC | Age: 34
Discharge: HOME | End: 2024-03-08
Payer: COMMERCIAL

## 2024-03-08 ENCOUNTER — HOSPITAL ENCOUNTER (INPATIENT)
Facility: HOSPITAL | Age: 34
LOS: 4 days | Discharge: HOME | End: 2024-03-12
Attending: OBSTETRICS & GYNECOLOGY | Admitting: OBSTETRICS & GYNECOLOGY
Payer: COMMERCIAL

## 2024-03-08 ENCOUNTER — ANESTHESIA EVENT (OUTPATIENT)
Dept: OBSTETRICS AND GYNECOLOGY | Facility: HOSPITAL | Age: 34
End: 2024-03-08
Payer: COMMERCIAL

## 2024-03-08 DIAGNOSIS — Z32.01 PREGNANCY TEST POSITIVE (HHS-HCC): ICD-10-CM

## 2024-03-08 DIAGNOSIS — O10.919 CHRONIC HYPERTENSION AFFECTING PREGNANCY (HHS-HCC): Primary | ICD-10-CM

## 2024-03-08 LAB
ABO GROUP (TYPE) IN BLOOD: NORMAL
ALBUMIN SERPL BCP-MCNC: 3.8 G/DL (ref 3.4–5)
ALP SERPL-CCNC: 85 U/L (ref 33–110)
ALT SERPL W P-5'-P-CCNC: 16 U/L (ref 7–45)
ANION GAP SERPL CALC-SCNC: 16 MMOL/L (ref 10–20)
ANTIBODY SCREEN: NORMAL
AST SERPL W P-5'-P-CCNC: 21 U/L (ref 9–39)
BILIRUB SERPL-MCNC: 0.3 MG/DL (ref 0–1.2)
BUN SERPL-MCNC: 7 MG/DL (ref 6–23)
CALCIUM SERPL-MCNC: 9.1 MG/DL (ref 8.6–10.6)
CHLORIDE SERPL-SCNC: 104 MMOL/L (ref 98–107)
CO2 SERPL-SCNC: 22 MMOL/L (ref 21–32)
CREAT SERPL-MCNC: 0.45 MG/DL (ref 0.5–1.05)
EGFRCR SERPLBLD CKD-EPI 2021: >90 ML/MIN/1.73M*2
ERYTHROCYTE [DISTWIDTH] IN BLOOD BY AUTOMATED COUNT: 12.8 % (ref 11.5–14.5)
GLUCOSE SERPL-MCNC: 103 MG/DL (ref 74–99)
HCT VFR BLD AUTO: 38.5 % (ref 36–46)
HGB BLD-MCNC: 13.2 G/DL (ref 12–16)
MCH RBC QN AUTO: 29.8 PG (ref 26–34)
MCHC RBC AUTO-ENTMCNC: 34.3 G/DL (ref 32–36)
MCV RBC AUTO: 87 FL (ref 80–100)
NRBC BLD-RTO: 0 /100 WBCS (ref 0–0)
PLATELET # BLD AUTO: 248 X10*3/UL (ref 150–450)
POTASSIUM SERPL-SCNC: 4.1 MMOL/L (ref 3.5–5.3)
PROT SERPL-MCNC: 7 G/DL (ref 6.4–8.2)
RBC # BLD AUTO: 4.43 X10*6/UL (ref 4–5.2)
RH FACTOR (ANTIGEN D): NORMAL
SODIUM SERPL-SCNC: 138 MMOL/L (ref 136–145)
TREPONEMA PALLIDUM IGG+IGM AB [PRESENCE] IN SERUM OR PLASMA BY IMMUNOASSAY: NONREACTIVE
WBC # BLD AUTO: 12 X10*3/UL (ref 4.4–11.3)

## 2024-03-08 PROCEDURE — 80053 COMPREHEN METABOLIC PANEL: CPT

## 2024-03-08 PROCEDURE — 85027 COMPLETE CBC AUTOMATED: CPT

## 2024-03-08 PROCEDURE — 2500000004 HC RX 250 GENERAL PHARMACY W/ HCPCS (ALT 636 FOR OP/ED)

## 2024-03-08 PROCEDURE — 1220000001 HC OB SEMI-PRIVATE ROOM DAILY

## 2024-03-08 PROCEDURE — 86901 BLOOD TYPING SEROLOGIC RH(D): CPT

## 2024-03-08 PROCEDURE — 2500000005 HC RX 250 GENERAL PHARMACY W/O HCPCS: Performed by: STUDENT IN AN ORGANIZED HEALTH CARE EDUCATION/TRAINING PROGRAM

## 2024-03-08 PROCEDURE — 76820 UMBILICAL ARTERY ECHO: CPT | Performed by: OBSTETRICS & GYNECOLOGY

## 2024-03-08 PROCEDURE — 76820 UMBILICAL ARTERY ECHO: CPT

## 2024-03-08 PROCEDURE — 59514 CESAREAN DELIVERY ONLY: CPT | Performed by: STUDENT IN AN ORGANIZED HEALTH CARE EDUCATION/TRAINING PROGRAM

## 2024-03-08 PROCEDURE — 76819 FETAL BIOPHYS PROFIL W/O NST: CPT | Performed by: OBSTETRICS & GYNECOLOGY

## 2024-03-08 PROCEDURE — 7210000002 HC LABOR PER HOUR

## 2024-03-08 PROCEDURE — 76816 OB US FOLLOW-UP PER FETUS: CPT

## 2024-03-08 PROCEDURE — 86920 COMPATIBILITY TEST SPIN: CPT

## 2024-03-08 PROCEDURE — 2500000005 HC RX 250 GENERAL PHARMACY W/O HCPCS: Performed by: ANESTHESIOLOGIST ASSISTANT

## 2024-03-08 PROCEDURE — 2720000007 HC OR 272 NO HCPCS: Performed by: STUDENT IN AN ORGANIZED HEALTH CARE EDUCATION/TRAINING PROGRAM

## 2024-03-08 PROCEDURE — 76816 OB US FOLLOW-UP PER FETUS: CPT | Performed by: OBSTETRICS & GYNECOLOGY

## 2024-03-08 PROCEDURE — 59050 FETAL MONITOR W/REPORT: CPT

## 2024-03-08 PROCEDURE — 76819 FETAL BIOPHYS PROFIL W/O NST: CPT

## 2024-03-08 PROCEDURE — 36415 COLL VENOUS BLD VENIPUNCTURE: CPT

## 2024-03-08 PROCEDURE — 3700000014 HC AN EPIDURAL BLOCK CHARGE: Performed by: STUDENT IN AN ORGANIZED HEALTH CARE EDUCATION/TRAINING PROGRAM

## 2024-03-08 PROCEDURE — 3700000018 HC OB ANESTHESIA C-SECTION: Performed by: STUDENT IN AN ORGANIZED HEALTH CARE EDUCATION/TRAINING PROGRAM

## 2024-03-08 PROCEDURE — 01967 NEURAXL LBR ANES VAG DLVR: CPT | Performed by: ANESTHESIOLOGY

## 2024-03-08 PROCEDURE — 01968 ANES/ANALG CS DLVR NEURAXIAL: CPT | Performed by: ANESTHESIOLOGY

## 2024-03-08 PROCEDURE — 2500000004 HC RX 250 GENERAL PHARMACY W/ HCPCS (ALT 636 FOR OP/ED): Performed by: ANESTHESIOLOGIST ASSISTANT

## 2024-03-08 PROCEDURE — 86780 TREPONEMA PALLIDUM: CPT

## 2024-03-08 PROCEDURE — 7100000016 HC LABOR RECOVERY PER HOUR: Performed by: STUDENT IN AN ORGANIZED HEALTH CARE EDUCATION/TRAINING PROGRAM

## 2024-03-08 RX ORDER — ONDANSETRON HYDROCHLORIDE 2 MG/ML
4 INJECTION, SOLUTION INTRAVENOUS EVERY 6 HOURS PRN
Status: DISCONTINUED | OUTPATIENT
Start: 2024-03-08 | End: 2024-03-09

## 2024-03-08 RX ORDER — ONDANSETRON 4 MG/1
4 TABLET, FILM COATED ORAL EVERY 6 HOURS PRN
Status: DISCONTINUED | OUTPATIENT
Start: 2024-03-08 | End: 2024-03-09

## 2024-03-08 RX ORDER — FENTANYL/BUPIVACAINE/NS/PF 2MCG/ML-.1
PLASTIC BAG, INJECTION (ML) INJECTION AS NEEDED
Status: DISCONTINUED | OUTPATIENT
Start: 2024-03-08 | End: 2024-03-09

## 2024-03-08 RX ORDER — LIDOCAINE HYDROCHLORIDE 10 MG/ML
30 INJECTION INFILTRATION; PERINEURAL ONCE AS NEEDED
Status: DISCONTINUED | OUTPATIENT
Start: 2024-03-08 | End: 2024-03-09

## 2024-03-08 RX ORDER — PENICILLIN G 3000000 [IU]/50ML
3 INJECTION, SOLUTION INTRAVENOUS EVERY 4 HOURS
Status: DISCONTINUED | OUTPATIENT
Start: 2024-03-08 | End: 2024-03-09

## 2024-03-08 RX ORDER — FENTANYL/BUPIVACAINE/NS/PF 2MCG/ML-.1
PLASTIC BAG, INJECTION (ML) INJECTION CONTINUOUS PRN
Status: DISCONTINUED | OUTPATIENT
Start: 2024-03-08 | End: 2024-03-09

## 2024-03-08 RX ORDER — TERBUTALINE SULFATE 1 MG/ML
0.25 INJECTION SUBCUTANEOUS ONCE AS NEEDED
Status: DISCONTINUED | OUTPATIENT
Start: 2024-03-08 | End: 2024-03-09

## 2024-03-08 RX ORDER — METOCLOPRAMIDE 10 MG/1
10 TABLET ORAL EVERY 6 HOURS PRN
Status: DISCONTINUED | OUTPATIENT
Start: 2024-03-08 | End: 2024-03-09

## 2024-03-08 RX ORDER — LOPERAMIDE HYDROCHLORIDE 2 MG/1
4 CAPSULE ORAL EVERY 2 HOUR PRN
Status: DISCONTINUED | OUTPATIENT
Start: 2024-03-08 | End: 2024-03-09

## 2024-03-08 RX ORDER — OXYTOCIN/0.9 % SODIUM CHLORIDE 30/500 ML
60 PLASTIC BAG, INJECTION (ML) INTRAVENOUS ONCE AS NEEDED
Status: COMPLETED | OUTPATIENT
Start: 2024-03-08 | End: 2024-03-09

## 2024-03-08 RX ORDER — OXYTOCIN/0.9 % SODIUM CHLORIDE 30/500 ML
2-30 PLASTIC BAG, INJECTION (ML) INTRAVENOUS CONTINUOUS
Status: DISCONTINUED | OUTPATIENT
Start: 2024-03-08 | End: 2024-03-09

## 2024-03-08 RX ORDER — NIFEDIPINE 30 MG/1
30 TABLET, FILM COATED, EXTENDED RELEASE ORAL
Status: DISCONTINUED | OUTPATIENT
Start: 2024-03-09 | End: 2024-03-11

## 2024-03-08 RX ORDER — NIFEDIPINE 10 MG/1
10 CAPSULE ORAL ONCE AS NEEDED
Status: DISCONTINUED | OUTPATIENT
Start: 2024-03-08 | End: 2024-03-09

## 2024-03-08 RX ORDER — CARBOPROST TROMETHAMINE 250 UG/ML
250 INJECTION, SOLUTION INTRAMUSCULAR ONCE AS NEEDED
Status: DISCONTINUED | OUTPATIENT
Start: 2024-03-08 | End: 2024-03-09

## 2024-03-08 RX ORDER — OXYTOCIN 10 [USP'U]/ML
10 INJECTION, SOLUTION INTRAMUSCULAR; INTRAVENOUS ONCE AS NEEDED
Status: DISCONTINUED | OUTPATIENT
Start: 2024-03-08 | End: 2024-03-09

## 2024-03-08 RX ORDER — TRANEXAMIC ACID 100 MG/ML
1000 INJECTION, SOLUTION INTRAVENOUS ONCE AS NEEDED
Status: DISCONTINUED | OUTPATIENT
Start: 2024-03-08 | End: 2024-03-09

## 2024-03-08 RX ORDER — CEFAZOLIN 1 G/1
INJECTION, POWDER, FOR SOLUTION INTRAVENOUS AS NEEDED
Status: DISCONTINUED | OUTPATIENT
Start: 2024-03-08 | End: 2024-03-09

## 2024-03-08 RX ORDER — HYDRALAZINE HYDROCHLORIDE 20 MG/ML
5 INJECTION INTRAMUSCULAR; INTRAVENOUS ONCE AS NEEDED
Status: DISCONTINUED | OUTPATIENT
Start: 2024-03-08 | End: 2024-03-09

## 2024-03-08 RX ORDER — MISOPROSTOL 200 UG/1
800 TABLET ORAL ONCE AS NEEDED
Status: DISCONTINUED | OUTPATIENT
Start: 2024-03-08 | End: 2024-03-09

## 2024-03-08 RX ORDER — MORPHINE SULFATE 2 MG/ML
2 INJECTION, SOLUTION INTRAMUSCULAR; INTRAVENOUS ONCE
Status: COMPLETED | OUTPATIENT
Start: 2024-03-08 | End: 2024-03-08

## 2024-03-08 RX ORDER — SODIUM CHLORIDE, SODIUM LACTATE, POTASSIUM CHLORIDE, CALCIUM CHLORIDE 600; 310; 30; 20 MG/100ML; MG/100ML; MG/100ML; MG/100ML
125 INJECTION, SOLUTION INTRAVENOUS CONTINUOUS
Status: DISCONTINUED | OUTPATIENT
Start: 2024-03-08 | End: 2024-03-09

## 2024-03-08 RX ORDER — DEXAMETHASONE SODIUM PHOSPHATE 4 MG/ML
INJECTION, SOLUTION INTRA-ARTICULAR; INTRALESIONAL; INTRAMUSCULAR; INTRAVENOUS; SOFT TISSUE AS NEEDED
Status: DISCONTINUED | OUTPATIENT
Start: 2024-03-08 | End: 2024-03-09

## 2024-03-08 RX ORDER — METOCLOPRAMIDE HYDROCHLORIDE 5 MG/ML
10 INJECTION INTRAMUSCULAR; INTRAVENOUS EVERY 6 HOURS PRN
Status: DISCONTINUED | OUTPATIENT
Start: 2024-03-08 | End: 2024-03-09

## 2024-03-08 RX ORDER — LABETALOL HYDROCHLORIDE 5 MG/ML
20 INJECTION, SOLUTION INTRAVENOUS ONCE AS NEEDED
Status: DISCONTINUED | OUTPATIENT
Start: 2024-03-08 | End: 2024-03-09

## 2024-03-08 RX ORDER — METHYLERGONOVINE MALEATE 0.2 MG/ML
0.2 INJECTION INTRAVENOUS ONCE AS NEEDED
Status: DISCONTINUED | OUTPATIENT
Start: 2024-03-08 | End: 2024-03-09

## 2024-03-08 RX ADMIN — LIDOCAINE HYDROCHLORIDE,EPINEPHRINE BITARTRATE 5 ML: 20; .005 INJECTION, SOLUTION EPIDURAL; INFILTRATION; INTRACAUDAL; PERINEURAL at 23:50

## 2024-03-08 RX ADMIN — Medication 5 ML: at 21:28

## 2024-03-08 RX ADMIN — PENICILLIN G POTASSIUM 5 MILLION UNITS: 5000000 INJECTION, POWDER, FOR SOLUTION INTRAMUSCULAR; INTRAVENOUS at 14:01

## 2024-03-08 RX ADMIN — CEFAZOLIN 2 G: 330 INJECTION, POWDER, FOR SOLUTION INTRAMUSCULAR; INTRAVENOUS at 23:55

## 2024-03-08 RX ADMIN — ONDANSETRON 4 MG: 2 INJECTION INTRAMUSCULAR; INTRAVENOUS at 23:55

## 2024-03-08 RX ADMIN — Medication 5 ML: at 21:26

## 2024-03-08 RX ADMIN — DEXAMETHASONE SODIUM PHOSPHATE 4 MG: 4 INJECTION INTRA-ARTICULAR; INTRALESIONAL; INTRAMUSCULAR; INTRAVENOUS; SOFT TISSUE at 23:55

## 2024-03-08 RX ADMIN — METOCLOPRAMIDE 10 MG: 5 INJECTION, SOLUTION INTRAMUSCULAR; INTRAVENOUS at 23:55

## 2024-03-08 RX ADMIN — MORPHINE SULFATE 2 MG: 2 INJECTION, SOLUTION INTRAMUSCULAR; INTRAVENOUS at 20:33

## 2024-03-08 RX ADMIN — SODIUM CHLORIDE, POTASSIUM CHLORIDE, SODIUM LACTATE AND CALCIUM CHLORIDE 500 ML: 600; 310; 30; 20 INJECTION, SOLUTION INTRAVENOUS at 21:05

## 2024-03-08 RX ADMIN — SODIUM CHLORIDE, POTASSIUM CHLORIDE, SODIUM LACTATE AND CALCIUM CHLORIDE 125 ML/HR: 600; 310; 30; 20 INJECTION, SOLUTION INTRAVENOUS at 21:36

## 2024-03-08 RX ADMIN — Medication 2 MILLI-UNITS/MIN: at 16:17

## 2024-03-08 RX ADMIN — LIDOCAINE HYDROCHLORIDE,EPINEPHRINE BITARTRATE 5 ML: 20; .005 INJECTION, SOLUTION EPIDURAL; INFILTRATION; INTRACAUDAL; PERINEURAL at 23:55

## 2024-03-08 RX ADMIN — AZITHROMYCIN 500 MG: 500 INJECTION, POWDER, LYOPHILIZED, FOR SOLUTION INTRAVENOUS at 23:53

## 2024-03-08 RX ADMIN — Medication 14 ML/HR: at 21:29

## 2024-03-08 SDOH — HEALTH STABILITY: MENTAL HEALTH: HAVE YOU USED ANY PRESCRIPTION DRUGS OTHER THAN PRESCRIBED IN THE PAST 12 MONTHS?: NO

## 2024-03-08 SDOH — HEALTH STABILITY: MENTAL HEALTH: WERE YOU ABLE TO COMPLETE ALL THE BEHAVIORAL HEALTH SCREENINGS?: YES

## 2024-03-08 SDOH — HEALTH STABILITY: PHYSICAL HEALTH: ON AVERAGE, HOW MANY DAYS PER WEEK DO YOU ENGAGE IN MODERATE TO STRENUOUS EXERCISE (LIKE A BRISK WALK)?: 5 DAYS

## 2024-03-08 SDOH — ECONOMIC STABILITY: FOOD INSECURITY: WITHIN THE PAST 12 MONTHS, YOU WORRIED THAT YOUR FOOD WOULD RUN OUT BEFORE YOU GOT MONEY TO BUY MORE.: NEVER TRUE

## 2024-03-08 SDOH — ECONOMIC STABILITY: FOOD INSECURITY: WITHIN THE PAST 12 MONTHS, THE FOOD YOU BOUGHT JUST DIDN'T LAST AND YOU DIDN'T HAVE MONEY TO GET MORE.: NEVER TRUE

## 2024-03-08 SDOH — HEALTH STABILITY: PHYSICAL HEALTH: ON AVERAGE, HOW MANY MINUTES DO YOU ENGAGE IN EXERCISE AT THIS LEVEL?: 50 MIN

## 2024-03-08 SDOH — HEALTH STABILITY: MENTAL HEALTH: NON-SPECIFIC ACTIVE SUICIDAL THOUGHTS (PAST 1 MONTH): NO

## 2024-03-08 SDOH — HEALTH STABILITY: MENTAL HEALTH: HAVE YOU USED ANY SUBSTANCES (CANABIS, COCAINE, HEROIN, HALLUCINOGENS, INHALANTS, ETC.) IN THE PAST 12 MONTHS?: NO

## 2024-03-08 SDOH — HEALTH STABILITY: MENTAL HEALTH: SUICIDAL BEHAVIOR (LIFETIME): NO

## 2024-03-08 SDOH — HEALTH STABILITY: MENTAL HEALTH: WISH TO BE DEAD (PAST 1 MONTH): NO

## 2024-03-08 ASSESSMENT — LIFESTYLE VARIABLES
HOW OFTEN DO YOU HAVE SIX OR MORE DRINKS ON ONE OCCASION: NEVER
HOW OFTEN DO YOU HAVE A DRINK CONTAINING ALCOHOL: NEVER
HOW MANY STANDARD DRINKS CONTAINING ALCOHOL DO YOU HAVE ON A TYPICAL DAY: PATIENT DOES NOT DRINK
AUDIT-C TOTAL SCORE: 0
SKIP TO QUESTIONS 9-10: 1

## 2024-03-08 ASSESSMENT — ACTIVITIES OF DAILY LIVING (ADL): LACK_OF_TRANSPORTATION: NO

## 2024-03-08 ASSESSMENT — PAIN SCALES - GENERAL
PAINLEVEL_OUTOF10: 0 - NO PAIN
PAINLEVEL_OUTOF10: 3
PAINLEVEL_OUTOF10: 0 - NO PAIN

## 2024-03-08 ASSESSMENT — EDINBURGH POSTNATAL DEPRESSION SCALE (EPDS)
I HAVE BEEN SO UNHAPPY THAT I HAVE HAD DIFFICULTY SLEEPING: NOT AT ALL
I HAVE FELT SAD OR MISERABLE: NO, NOT AT ALL
I HAVE BEEN SO UNHAPPY THAT I HAVE BEEN CRYING: NO, NEVER
I HAVE FELT SCARED OR PANICKY FOR NO GOOD REASON: NO, NOT AT ALL
I HAVE BEEN ANXIOUS OR WORRIED FOR NO GOOD REASON: NO, NOT AT ALL
TOTAL SCORE: 1
THINGS HAVE BEEN GETTING ON TOP OF ME: NO, I HAVE BEEN COPING AS WELL AS EVER
I HAVE BLAMED MYSELF UNNECESSARILY WHEN THINGS WENT WRONG: NOT VERY OFTEN
THE THOUGHT OF HARMING MYSELF HAS OCCURRED TO ME: NEVER
I HAVE BEEN ABLE TO LAUGH AND SEE THE FUNNY SIDE OF THINGS: AS MUCH AS I ALWAYS COULD
I HAVE LOOKED FORWARD WITH ENJOYMENT TO THINGS: AS MUCH AS I EVER DID

## 2024-03-08 NOTE — ANESTHESIA PREPROCEDURE EVALUATION
Patient: Isela Alvarado    Evaluation Method: In-person visit    Procedure Information    Date: 24  Procedure: Labor Consult         Relevant Problems   Anesthesia (within normal limits)      Cardiovascular   (+) Chronic hypertension affecting pregnancy      Endocrine (within normal limits)      GI (within normal limits)      /Renal (within normal limits)      Neuro/Psych (within normal limits)      Pulmonary (within normal limits)      GI/Hepatic (within normal limits)      Hematology (within normal limits)      Musculoskeletal (within normal limits)       Clinical information reviewed:   Tobacco  Allergies  Meds   Med Hx  Surg Hx   Fam Hx  Soc Hx        NPO Detail:  No data recorded     OB/Gyn Evaluation    Present Pregnancy    (+) , previous  section - primary   Obstetric History                Physical Exam    Airway  Mallampati: II  TM distance: >3 FB  Neck ROM: full     Cardiovascular    Dental    Pulmonary    Abdominal            Anesthesia Plan    History of general anesthesia?: yes  History of complications of general anesthesia?: no    ASA 3     epidural   (GA and epidural R/B discussed.  Questions welcomed.  Pt agrees with plan.)  Anesthetic plan and risks discussed with patient.  Use of blood products discussed with patient who consented to blood products.    Plan discussed with [facility] and attending.

## 2024-03-08 NOTE — H&P
Obstetrical Admission History and Physical     Isela Alvarado is a 33 y.o.  at 36w4d by LMP c/w 13wk US presents for IOL in the setting of sFGR with reduced interval growth and elevated UA dopplers, as well as cHTN.     Chief Complaint: Scheduled Induction    Assessment/Plan      IOL, TOLAC   - Admit to L&D, consented, scanned, cephalic  - MFMU score 58%  - We discussed the risks of RCD including the surgical risks (bleeding, infection, injury to adjacent organs, transfusion) as well as the implications for future pregnancies (subsequent delivery by RCD, risk of invasive placenta/accreta).  We also reviewed the risks of TOLAC including the risk of uterine rupture of ~0.5-0.9% and a 10-15% risk of adverse maternal or  outcome in the context of rupture. Strongly prefers TOLAC and consent signed.  - Discussed risk of fetal intolerance of labor in the setting of sFGR and possible need for CS, patient expressed understanding and strongly desires trial of labor  - Discussed methods for induction of labor and need for pitocin, as well as physiology of pitocin and risks associated with alternate IOL medications such as misoprostol in TOLAC. Discussed indications for IOL given sFGR as below. All questions answered, patient amenable to IOL with pitocin  - CRB placed, will start pitocin now  - Hgb on admission 13.2, T&C x1U in setting of tolac  - Epidural per pt request, desires non-medicated birth. Discussed with patient recommendation for epidural placement without infusion of medication in the setting of tolac. She is amenable to this.     cHTN  - continue nifed 30  - no PEC sxs  - HELLP labs on admission wnl  - normotensive    Fetal wellbeing, sFGR  - CEFM, Cat I currently  - GBS pending 3/7, PCN in the setting of GBS unknown and   - EFW on 3/8 EFW 2161g (2%), AC <1%, elevated UA dopplers    Postpartum planning  - ppBC: POPs    Discussed with Dr. Neha Somers MD  PGY-2, Obstetrics and  Gynecology      Principal Problem:    Labor and delivery indication for care or intervention      Pregnancy Problems (from 23 to present)       Problem Noted Resolved    Labor and delivery indication for care or intervention 3/8/2024 by Kamini Salcedo MD No    Priority:  Medium      Ultrasound for  screening for fetal growth restriction 2023 by SERGIO Taveras No    Priority:  Medium      Overview Addendum 2023 10:25 AM by SERGIO Taveras     At 24.1 wks, EFW 3%ile, AC 2%ile, dopplers wnl; recommend weekly dopplers and BPP         Chronic hypertension affecting pregnancy 2023 by Irena Alberto MD No    Priority:  Medium      Overview Signed 2023  9:51 AM by Irena Alberto MD     Hx PP HTN with last pregnancy  On bASA daily  BP in treatment range at 21 weeks (normal at initial OB at 13 weeks)   Start Nifed 30mg daily  No baseline labs. Sent at 21 weeks.               Subjective   Isela is a 32 yo  @ 36w4d by LMP cw 13 wk us presenting for TOLAC/IOL for sFGR with reduced interval growth and elevated UA dopplers.    Pregnancy notable for:  - sFGR with reduced interval growth and elevated UA dopplers   - cHTN on Nifed 30   -  -> h/o CS x1 for NRFHT, MFMU 57.8% in s/o cHTN    Obstetrical History   OB History    Para Term  AB Living   3 2 2     2   SAB IAB Ectopic Multiple Live Births           2      # Outcome Date GA Lbr Calin/2nd Weight Sex Delivery Anes PTL Lv   3 Current            2 Term 06/15/22 38w6d  3.147 kg M CS-LTranv EPI N LEONA   1 Term 10/05/12 38w0d  3.289 kg M Vag-Spont  N LEONA       Past Medical History  Past Medical History:   Diagnosis Date    Atopic dermatitis 2015    Bacterial vaginosis 10/14/2023    Dysmenorrhea 2005    Lactose intolerance 2009    Other ovarian cyst, unspecified side 2008    Formatting of this note might be different from the original. Noted on exam.  Repeat exam  planned in 3 weeks.  Nonpainful on the L.    Postpartum hypertension 10/14/2023    Seasonal allergies 2016    Trichomonas infection 10/14/2023    Yeast vaginitis 10/14/2023        Past Surgical History   Past Surgical History:   Procedure Laterality Date     SECTION, LOW TRANSVERSE  06/15/2022       Social History  Social History     Tobacco Use    Smoking status: Never    Smokeless tobacco: Never   Substance Use Topics    Alcohol use: Not on file     Substance and Sexual Activity   Drug Use Not on file       Allergies  Patient has no known allergies.     Medications  Medications Prior to Admission   Medication Sig Dispense Refill Last Dose    acetaminophen (Tylenol Extra Strength) 500 mg tablet Take 2 tablets (1,000 mg) by mouth every 6 hours.   More than a month    acetaminophen (Tylenol) 325 mg tablet Take 3 tablets (975 mg) by mouth every 8 hours.   More than a month    aspirin 81 mg chewable tablet Chew 2 tablets (162 mg) once daily. 180 tablet 3 3/8/2024    desoximetasone (Topicort) 0.05 % ointment 1 Application       docusate sodium (Colace) 100 mg capsule Docusate Sodium 100 MG Oral Capsule   Quantity: 30  Refills: 0        Start : 2022   Active   More than a month    folic acid (Folvite) 400 mcg tablet Take 1 tablet (0.4 mg) by mouth once daily. as directed   3/8/2024    NIFEdipine ER (Adalat CC) 30 mg 24 hr tablet Take 1 tablet (30 mg) by mouth once daily in the morning. Take before meals. Do not crush, chew, or split. 30 tablet 11 3/8/2024    prenatal vitamin (Classic Prenatal) 28 mg iron-800 mcg folic acid tablet tablet Take 1 tablet by mouth once daily. 90 tablet 3 3/8/2024       Objective    Last Vitals  Temp Pulse Resp BP MAP O2 Sat   36.4 °C (97.5 °F) 89 16 131/88   99 %     Physical Examination  General: Well appearing, alert  HEENT: normocephalic, EOMI, clear sclera  Cardio: Warm and well perfused  Resp: breathing comfortably on room air  Abd: soft, gravid, nontender  Neuro:  grossly intact, no focal deficits  Extremities: full ROM, no calf tenderness  Psych: A&O x3, appropriate mood and affect    BSUS; cephalic fetus    SVE: c/l/h  Patient was placed in dorsal lithotomy position, a speculum was placed, and a cervical ripening balloon was guided through the external and internal cervical os with a ring forceps. The balloon was inflated with 60cc of normal saline. Pt tolerated the procedure well.    Lab Review  Lab Results   Component Value Date    ABO O 03/08/2024    LABRH POS 03/08/2024    ABSCRN NEG 03/08/2024     Lab Results   Component Value Date    WBC 12.0 (H) 03/08/2024    HGB 13.2 03/08/2024    HCT 38.5 03/08/2024     03/08/2024     Lab Results   Component Value Date    GLUCOSE 103 (H) 03/08/2024     03/08/2024    K 4.1 03/08/2024     03/08/2024    CO2 22 03/08/2024    ANIONGAP 16 03/08/2024    BUN 7 03/08/2024    CREATININE 0.45 (L) 03/08/2024    EGFR >90 03/08/2024    CALCIUM 9.1 03/08/2024    ALBUMIN 3.8 03/08/2024    PROT 7.0 03/08/2024    ALKPHOS 85 03/08/2024    ALT 16 03/08/2024    AST 21 03/08/2024    BILITOT 0.3 03/08/2024

## 2024-03-09 LAB
ANION GAP SERPL CALC-SCNC: 10 MMOL/L (ref 10–20)
APTT PPP: 26 SECONDS (ref 27–38)
BASE EXCESS BLDCOV CALC-SCNC: -1.1 MMOL/L (ref -8.1–-0.5)
BLOOD EXPIRATION DATE: NORMAL
BLOOD EXPIRATION DATE: NORMAL
BODY TEMPERATURE: 37 DEGREES CELSIUS
BUN SERPL-MCNC: 5 MG/DL (ref 6–23)
CALCIUM SERPL-MCNC: 8 MG/DL (ref 8.6–10.6)
CHLORIDE SERPL-SCNC: 103 MMOL/L (ref 98–107)
CO2 SERPL-SCNC: 22 MMOL/L (ref 21–32)
CREAT SERPL-MCNC: 0.45 MG/DL (ref 0.5–1.05)
DISPENSE STATUS: NORMAL
DISPENSE STATUS: NORMAL
EGFRCR SERPLBLD CKD-EPI 2021: >90 ML/MIN/1.73M*2
ERYTHROCYTE [DISTWIDTH] IN BLOOD BY AUTOMATED COUNT: 12.8 % (ref 11.5–14.5)
ERYTHROCYTE [DISTWIDTH] IN BLOOD BY AUTOMATED COUNT: 13.1 % (ref 11.5–14.5)
FIBRINOGEN PPP-MCNC: 321 MG/DL (ref 200–400)
GLUCOSE SERPL-MCNC: 99 MG/DL (ref 74–99)
HCO3 BLDCOV-SCNC: 25.3 MMOL/L (ref 16–26)
HCT VFR BLD AUTO: 30.5 % (ref 36–46)
HCT VFR BLD AUTO: 33.4 % (ref 36–46)
HGB BLD-MCNC: 10.6 G/DL (ref 12–16)
HGB BLD-MCNC: 11.2 G/DL (ref 12–16)
INHALED O2 CONCENTRATION: 21 %
INR PPP: 0.9 (ref 0.9–1.1)
MCH RBC QN AUTO: 29.9 PG (ref 26–34)
MCH RBC QN AUTO: 30.3 PG (ref 26–34)
MCHC RBC AUTO-ENTMCNC: 33.5 G/DL (ref 32–36)
MCHC RBC AUTO-ENTMCNC: 34.8 G/DL (ref 32–36)
MCV RBC AUTO: 87 FL (ref 80–100)
MCV RBC AUTO: 89 FL (ref 80–100)
NRBC BLD-RTO: 0 /100 WBCS (ref 0–0)
NRBC BLD-RTO: 0 /100 WBCS (ref 0–0)
OXYHGB MFR BLDCOV: 42.2 % (ref 94–98)
PCO2 BLDCOV: 48 MM HG (ref 22–53)
PH BLDCOV: 7.33 PH (ref 7.19–7.47)
PLATELET # BLD AUTO: 198 X10*3/UL (ref 150–450)
PLATELET # BLD AUTO: 201 X10*3/UL (ref 150–450)
PO2 BLDCOV: 24 MM HG (ref 13–37)
POTASSIUM SERPL-SCNC: 4 MMOL/L (ref 3.5–5.3)
PRODUCT BLOOD TYPE: 5100
PRODUCT BLOOD TYPE: 7300
PRODUCT CODE: NORMAL
PRODUCT CODE: NORMAL
PROTHROMBIN TIME: 10.4 SECONDS (ref 9.8–12.8)
RBC # BLD AUTO: 3.5 X10*6/UL (ref 4–5.2)
RBC # BLD AUTO: 3.74 X10*6/UL (ref 4–5.2)
SAO2 % BLDCOV: 43 % (ref 16–84)
SODIUM SERPL-SCNC: 131 MMOL/L (ref 136–145)
UNIT ABO: NORMAL
UNIT ABO: NORMAL
UNIT NUMBER: NORMAL
UNIT NUMBER: NORMAL
UNIT RH: NORMAL
UNIT RH: NORMAL
UNIT VOLUME: 212
UNIT VOLUME: 289
WBC # BLD AUTO: 16.5 X10*3/UL (ref 4.4–11.3)
WBC # BLD AUTO: 17.2 X10*3/UL (ref 4.4–11.3)

## 2024-03-09 PROCEDURE — 2500000004 HC RX 250 GENERAL PHARMACY W/ HCPCS (ALT 636 FOR OP/ED)

## 2024-03-09 PROCEDURE — 82805 BLOOD GASES W/O2 SATURATION: CPT

## 2024-03-09 PROCEDURE — 2500000004 HC RX 250 GENERAL PHARMACY W/ HCPCS (ALT 636 FOR OP/ED): Performed by: ANESTHESIOLOGIST ASSISTANT

## 2024-03-09 PROCEDURE — 1210000001 HC SEMI-PRIVATE ROOM DAILY

## 2024-03-09 PROCEDURE — 80048 BASIC METABOLIC PNL TOTAL CA: CPT

## 2024-03-09 PROCEDURE — 85027 COMPLETE CBC AUTOMATED: CPT

## 2024-03-09 PROCEDURE — 36415 COLL VENOUS BLD VENIPUNCTURE: CPT

## 2024-03-09 PROCEDURE — 85610 PROTHROMBIN TIME: CPT

## 2024-03-09 PROCEDURE — 2500000004 HC RX 250 GENERAL PHARMACY W/ HCPCS (ALT 636 FOR OP/ED): Performed by: STUDENT IN AN ORGANIZED HEALTH CARE EDUCATION/TRAINING PROGRAM

## 2024-03-09 PROCEDURE — 85384 FIBRINOGEN ACTIVITY: CPT

## 2024-03-09 PROCEDURE — 2500000001 HC RX 250 WO HCPCS SELF ADMINISTERED DRUGS (ALT 637 FOR MEDICARE OP)

## 2024-03-09 PROCEDURE — 88307 TISSUE EXAM BY PATHOLOGIST: CPT | Mod: TC,SUR

## 2024-03-09 PROCEDURE — 88307 TISSUE EXAM BY PATHOLOGIST: CPT | Performed by: PATHOLOGY

## 2024-03-09 PROCEDURE — 2500000002 HC RX 250 W HCPCS SELF ADMINISTERED DRUGS (ALT 637 FOR MEDICARE OP, ALT 636 FOR OP/ED)

## 2024-03-09 RX ORDER — ACETAMINOPHEN 325 MG/1
975 TABLET ORAL EVERY 6 HOURS
Status: DISCONTINUED | OUTPATIENT
Start: 2024-03-09 | End: 2024-03-12 | Stop reason: HOSPADM

## 2024-03-09 RX ORDER — METHYLERGONOVINE MALEATE 0.2 MG/ML
0.2 INJECTION INTRAVENOUS ONCE AS NEEDED
Status: DISCONTINUED | OUTPATIENT
Start: 2024-03-09 | End: 2024-03-12 | Stop reason: HOSPADM

## 2024-03-09 RX ORDER — DIPHENHYDRAMINE HYDROCHLORIDE 50 MG/ML
25 INJECTION INTRAMUSCULAR; INTRAVENOUS EVERY 4 HOURS PRN
Status: DISCONTINUED | OUTPATIENT
Start: 2024-03-09 | End: 2024-03-12 | Stop reason: HOSPADM

## 2024-03-09 RX ORDER — MORPHINE SULFATE 0.5 MG/ML
INJECTION, SOLUTION EPIDURAL; INTRATHECAL; INTRAVENOUS AS NEEDED
Status: DISCONTINUED | OUTPATIENT
Start: 2024-03-09 | End: 2024-03-09

## 2024-03-09 RX ORDER — ENOXAPARIN SODIUM 100 MG/ML
40 INJECTION SUBCUTANEOUS EVERY 24 HOURS
Status: DISCONTINUED | OUTPATIENT
Start: 2024-03-09 | End: 2024-03-12 | Stop reason: HOSPADM

## 2024-03-09 RX ORDER — DIPHENHYDRAMINE HCL 25 MG
25 CAPSULE ORAL EVERY 4 HOURS PRN
Status: DISCONTINUED | OUTPATIENT
Start: 2024-03-09 | End: 2024-03-12 | Stop reason: HOSPADM

## 2024-03-09 RX ORDER — HYDROMORPHONE HYDROCHLORIDE 1 MG/ML
0.2 INJECTION, SOLUTION INTRAMUSCULAR; INTRAVENOUS; SUBCUTANEOUS EVERY 5 MIN PRN
Status: DISCONTINUED | OUTPATIENT
Start: 2024-03-09 | End: 2024-03-12 | Stop reason: HOSPADM

## 2024-03-09 RX ORDER — ONDANSETRON 4 MG/1
4 TABLET, FILM COATED ORAL EVERY 6 HOURS PRN
Status: DISCONTINUED | OUTPATIENT
Start: 2024-03-09 | End: 2024-03-12 | Stop reason: HOSPADM

## 2024-03-09 RX ORDER — MORPHINE SULFATE 10 MG/ML
INJECTION, SOLUTION INTRAMUSCULAR; INTRAVENOUS CONTINUOUS PRN
Status: DISCONTINUED | OUTPATIENT
Start: 2024-03-09 | End: 2024-03-09

## 2024-03-09 RX ORDER — NALOXONE HYDROCHLORIDE 0.4 MG/ML
0.1 INJECTION, SOLUTION INTRAMUSCULAR; INTRAVENOUS; SUBCUTANEOUS EVERY 5 MIN PRN
Status: DISCONTINUED | OUTPATIENT
Start: 2024-03-09 | End: 2024-03-12 | Stop reason: HOSPADM

## 2024-03-09 RX ORDER — OXYTOCIN/0.9 % SODIUM CHLORIDE 30/500 ML
60 PLASTIC BAG, INJECTION (ML) INTRAVENOUS ONCE AS NEEDED
Status: DISCONTINUED | OUTPATIENT
Start: 2024-03-09 | End: 2024-03-12 | Stop reason: HOSPADM

## 2024-03-09 RX ORDER — NIFEDIPINE 10 MG/1
10 CAPSULE ORAL ONCE AS NEEDED
Status: DISCONTINUED | OUTPATIENT
Start: 2024-03-09 | End: 2024-03-12 | Stop reason: HOSPADM

## 2024-03-09 RX ORDER — KETOROLAC TROMETHAMINE 30 MG/ML
INJECTION, SOLUTION INTRAMUSCULAR; INTRAVENOUS AS NEEDED
Status: DISCONTINUED | OUTPATIENT
Start: 2024-03-09 | End: 2024-03-09

## 2024-03-09 RX ORDER — KETOROLAC TROMETHAMINE 30 MG/ML
30 INJECTION, SOLUTION INTRAMUSCULAR; INTRAVENOUS EVERY 6 HOURS
Status: COMPLETED | OUTPATIENT
Start: 2024-03-09 | End: 2024-03-09

## 2024-03-09 RX ORDER — LIDOCAINE HCL/EPINEPHRINE/PF 2%-1:200K
VIAL (ML) INJECTION AS NEEDED
Status: DISCONTINUED | OUTPATIENT
Start: 2024-03-08 | End: 2024-03-09

## 2024-03-09 RX ORDER — SODIUM CHLORIDE, SODIUM LACTATE, POTASSIUM CHLORIDE, CALCIUM CHLORIDE 600; 310; 30; 20 MG/100ML; MG/100ML; MG/100ML; MG/100ML
INJECTION, SOLUTION INTRAVENOUS CONTINUOUS PRN
Status: DISCONTINUED | OUTPATIENT
Start: 2024-03-09 | End: 2024-03-09

## 2024-03-09 RX ORDER — LOPERAMIDE HYDROCHLORIDE 2 MG/1
4 CAPSULE ORAL EVERY 2 HOUR PRN
Status: DISCONTINUED | OUTPATIENT
Start: 2024-03-09 | End: 2024-03-12 | Stop reason: HOSPADM

## 2024-03-09 RX ORDER — ADHESIVE BANDAGE
30 BANDAGE TOPICAL
Status: DISCONTINUED | OUTPATIENT
Start: 2024-03-09 | End: 2024-03-12 | Stop reason: HOSPADM

## 2024-03-09 RX ORDER — TRANEXAMIC ACID 100 MG/ML
1000 INJECTION, SOLUTION INTRAVENOUS ONCE AS NEEDED
Status: DISCONTINUED | OUTPATIENT
Start: 2024-03-09 | End: 2024-03-12 | Stop reason: HOSPADM

## 2024-03-09 RX ORDER — MISOPROSTOL 200 UG/1
800 TABLET ORAL ONCE AS NEEDED
Status: DISCONTINUED | OUTPATIENT
Start: 2024-03-09 | End: 2024-03-12 | Stop reason: HOSPADM

## 2024-03-09 RX ORDER — OXYTOCIN 10 [USP'U]/ML
10 INJECTION, SOLUTION INTRAMUSCULAR; INTRAVENOUS ONCE AS NEEDED
Status: DISCONTINUED | OUTPATIENT
Start: 2024-03-09 | End: 2024-03-12 | Stop reason: HOSPADM

## 2024-03-09 RX ORDER — IBUPROFEN 600 MG/1
600 TABLET ORAL EVERY 6 HOURS
Status: DISCONTINUED | OUTPATIENT
Start: 2024-03-10 | End: 2024-03-12 | Stop reason: HOSPADM

## 2024-03-09 RX ORDER — HYDRALAZINE HYDROCHLORIDE 20 MG/ML
5 INJECTION INTRAMUSCULAR; INTRAVENOUS ONCE AS NEEDED
Status: DISCONTINUED | OUTPATIENT
Start: 2024-03-09 | End: 2024-03-12 | Stop reason: HOSPADM

## 2024-03-09 RX ORDER — LIDOCAINE 560 MG/1
1 PATCH PERCUTANEOUS; TOPICAL; TRANSDERMAL
Status: DISCONTINUED | OUTPATIENT
Start: 2024-03-09 | End: 2024-03-12 | Stop reason: HOSPADM

## 2024-03-09 RX ORDER — CARBOPROST TROMETHAMINE 250 UG/ML
250 INJECTION, SOLUTION INTRAMUSCULAR ONCE AS NEEDED
Status: DISCONTINUED | OUTPATIENT
Start: 2024-03-09 | End: 2024-03-12 | Stop reason: HOSPADM

## 2024-03-09 RX ORDER — POLYETHYLENE GLYCOL 3350 17 G/17G
17 POWDER, FOR SOLUTION ORAL 2 TIMES DAILY PRN
Status: DISCONTINUED | OUTPATIENT
Start: 2024-03-09 | End: 2024-03-12 | Stop reason: HOSPADM

## 2024-03-09 RX ORDER — TRANEXAMIC ACID 10 MG/ML
INJECTION, SOLUTION INTRAVENOUS AS NEEDED
Status: DISCONTINUED | OUTPATIENT
Start: 2024-03-09 | End: 2024-03-09

## 2024-03-09 RX ORDER — LABETALOL HYDROCHLORIDE 5 MG/ML
20 INJECTION, SOLUTION INTRAVENOUS ONCE AS NEEDED
Status: DISCONTINUED | OUTPATIENT
Start: 2024-03-09 | End: 2024-03-12 | Stop reason: HOSPADM

## 2024-03-09 RX ORDER — OXYCODONE HYDROCHLORIDE 5 MG/1
10 TABLET ORAL EVERY 4 HOURS PRN
Status: DISCONTINUED | OUTPATIENT
Start: 2024-03-10 | End: 2024-03-12 | Stop reason: HOSPADM

## 2024-03-09 RX ORDER — BISACODYL 10 MG/1
10 SUPPOSITORY RECTAL DAILY PRN
Status: DISCONTINUED | OUTPATIENT
Start: 2024-03-09 | End: 2024-03-12 | Stop reason: HOSPADM

## 2024-03-09 RX ORDER — SIMETHICONE 80 MG
80 TABLET,CHEWABLE ORAL 4 TIMES DAILY PRN
Status: DISCONTINUED | OUTPATIENT
Start: 2024-03-09 | End: 2024-03-12 | Stop reason: HOSPADM

## 2024-03-09 RX ORDER — OXYCODONE HYDROCHLORIDE 5 MG/1
5 TABLET ORAL EVERY 4 HOURS PRN
Status: DISCONTINUED | OUTPATIENT
Start: 2024-03-10 | End: 2024-03-12 | Stop reason: HOSPADM

## 2024-03-09 RX ORDER — ONDANSETRON HYDROCHLORIDE 2 MG/ML
4 INJECTION, SOLUTION INTRAVENOUS EVERY 6 HOURS PRN
Status: DISCONTINUED | OUTPATIENT
Start: 2024-03-09 | End: 2024-03-12 | Stop reason: HOSPADM

## 2024-03-09 RX ORDER — BUTORPHANOL TARTRATE 1 MG/ML
1 INJECTION INTRAMUSCULAR; INTRAVENOUS
Status: DISCONTINUED | OUTPATIENT
Start: 2024-03-09 | End: 2024-03-12 | Stop reason: HOSPADM

## 2024-03-09 RX ADMIN — TRANEXAMIC ACID 1000 MG: 10 INJECTION, SOLUTION INTRAVENOUS at 00:20

## 2024-03-09 RX ADMIN — Medication 60 MILLI-UNITS/MIN: at 02:19

## 2024-03-09 RX ADMIN — MORPHINE SULFATE 3 MG: 0.5 INJECTION EPIDURAL; INTRATHECAL; INTRAVENOUS at 00:25

## 2024-03-09 RX ADMIN — SODIUM CHLORIDE, POTASSIUM CHLORIDE, SODIUM LACTATE AND CALCIUM CHLORIDE: 600; 310; 30; 20 INJECTION, SOLUTION INTRAVENOUS at 00:17

## 2024-03-09 RX ADMIN — ACETAMINOPHEN 975 MG: 325 TABLET ORAL at 20:59

## 2024-03-09 RX ADMIN — NIFEDIPINE 30 MG: 30 TABLET, FILM COATED, EXTENDED RELEASE ORAL at 06:37

## 2024-03-09 RX ADMIN — OXYTOCIN 600 MILLI-UNITS/MIN: 10 INJECTION, SOLUTION INTRAMUSCULAR; INTRAVENOUS at 00:04

## 2024-03-09 RX ADMIN — ACETAMINOPHEN 975 MG: 325 TABLET ORAL at 08:42

## 2024-03-09 RX ADMIN — ENOXAPARIN SODIUM 40 MG: 100 INJECTION SUBCUTANEOUS at 18:07

## 2024-03-09 RX ADMIN — KETOROLAC TROMETHAMINE 30 MG: 30 INJECTION, SOLUTION INTRAMUSCULAR at 08:42

## 2024-03-09 RX ADMIN — KETOROLAC TROMETHAMINE 30 MG: 30 INJECTION, SOLUTION INTRAMUSCULAR at 14:30

## 2024-03-09 RX ADMIN — ACETAMINOPHEN 975 MG: 325 TABLET ORAL at 14:30

## 2024-03-09 RX ADMIN — KETOROLAC TROMETHAMINE 30 MG: 30 INJECTION, SOLUTION INTRAMUSCULAR at 20:57

## 2024-03-09 RX ADMIN — KETOROLAC TROMETHAMINE 30 MG: 30 INJECTION, SOLUTION INTRAMUSCULAR at 00:32

## 2024-03-09 RX ADMIN — DIPHENHYDRAMINE HYDROCHLORIDE 25 MG: 25 CAPSULE ORAL at 05:20

## 2024-03-09 ASSESSMENT — PAIN - FUNCTIONAL ASSESSMENT: PAIN_FUNCTIONAL_ASSESSMENT: 0-10

## 2024-03-09 ASSESSMENT — PAIN SCALES - GENERAL
PAINLEVEL_OUTOF10: 0 - NO PAIN
PAINLEVEL_OUTOF10: 8
PAINLEVEL_OUTOF10: 6
PAIN_LEVEL: 0
PAINLEVEL_OUTOF10: 0 - NO PAIN

## 2024-03-09 ASSESSMENT — PAIN DESCRIPTION - DESCRIPTORS: DESCRIPTORS: THROBBING;SHARP

## 2024-03-09 ASSESSMENT — PAIN DESCRIPTION - LOCATION: LOCATION: ABDOMEN

## 2024-03-09 NOTE — LACTATION NOTE
Lactation Consultant Note  Lactation Consultation  Reason for Consult: Initial assessment, Late  infant  Consultant Name: Candy Richardson Rn, IBCLC    Maternal Information  Has mother  before?: Yes  How long did the mother previously breastfeed?: two older children- 1 year  Infant to breast within first 2 hours of birth?: Yes  Exclusive Pump and Bottle Feed: No (will also pump d/t late  infant)    Maternal Assessment       Infant Assessment  Infant Behavior: Deep sleep, Other (Comment) (in Dad's arms)    Feeding Assessment  Nutrition Source: Breastmilk  Feeding Method: Nursing at the breast, Feeding expressed breastmilk, Syringe feeding    LATCH TOOL       Breast Pump  Pump: Hospital grade electric pump, Double breast pumping  Frequency: 8-10 times per day  Duration: Initiate phase    Other OB Lactation Tools       Patient Follow-up  Inpatient Lactation Follow-up Needed : Yes  Outpatient Lactation Follow-up: Recommended    Other OB Lactation Documentation  Maternal Risk Factors: Hypertension,  delivery  Infant Risk Factors: Prematurity <37 weeks, Low birth weight <2500 g    Recommendations/Summary  I did not view a latch at this time.   Mom stated she recently attempted to latch at the breast, but, the baby wasn't interested and she fed pumped breast milk via syringe.     No feeding cues seen at this time.     Mom stated she successfully breast fed her other two children and denied issues with milk production / supply.     Reviewed feeding cues, the normal feeding patterns in the first 24 hours of life, the role of colostrum, output on different days of life, milk production/ supply in the first few days of life, how late  status can affect breast feeding, and the benefits of skin to skin.      Encouraged mom to breastfeed on demand with a goal of 8-12 feeds in a 24 hour period.   If baby is not showing feeding cues and it has been 3 hours since the last time the baby  was fed or the last time the baby attempted to feed, encouraged her to place baby in skin to skin.    Encouraged her to pump every 3 hours (8-12 times in a 24 hour period) for 20 minutes on both breasts and to give the baby any pumped breast milk prior to any use of supplement.      PI-123 and [facility] pump cleaning guide given.     Mom stated she has a breast pump for at home.   Denies any questions or concerns at this time.     Encouraged her to call for assistance with feeding, if needed.     Encouraged her to utilize the outpatient lactation resources, if needed.   Contact information given.   [phone] [facility] or [phone] Rafi

## 2024-03-09 NOTE — DISCHARGE INSTRUCTIONS

## 2024-03-09 NOTE — SIGNIFICANT EVENT
Decision for urgent  delivery  Called to bedside by R4.  On arrival to room, patient reports feeling cold and shakey but denies pain or lightheadedness.  R1 attempting vaginal exam with significant blood and clot on christi pad beneath patient.  R1 explained that, with attempt to assess cervix, she noted abrupt efflux of blood and clot, and on repeat attempt to assess cervical exam while I was present, additional brisk bleeding and efflux of clot occurring with this attempt.  R1 reports being unable to feel fetal presenting part.  On my exam, no uterine tetany or tenderness is present with abdominal palpation.  RN at bedside having difficulty locating fetal heart rate but was recently 120s with moderate variability.  Intermittently FHR in 120s is auscultated but difficulty maintaining continuous tracing.  Maternal heart rate low 100s.    Discussed with Ms. Alvarado that, in the context of TOLAC, brisk vaginal bleeding and inability to palpate fetal presenting part are concerning for uterine rupture.  Although we are unable to be certain that this is occurring, and materna VS are currently normal and FHR, when audible, remains normal, I advised proceeding urgently with  delivery for suspicion of uterine rupture, as, if present, this could result in rapid maternal and fetal decompensation.  She expressed understanding and was in agreement with RCS.  Decision time 2342.  All teams notified and [id number] report from myself to anesthesiology attending, Dr. Meneses, completed.    Apurva Osei MD

## 2024-03-09 NOTE — ANESTHESIA POSTPROCEDURE EVALUATION
Patient: Isela Alvarado    Procedure Summary       Date: 03/08/24 Room / Location:     Anesthesia Start: 2115 Anesthesia Stop: 03/09/24 0112    Procedure: Labor Analgesia Diagnosis:     Scheduled Providers:  Responsible Provider: Tami Meneses MD    Anesthesia Type: epidural ASA Status: 3            Anesthesia Type: epidural    Vitals Value Taken Time   /61 03/09/24 0112   Temp wnl 03/09/24 0115   Pulse 105 03/09/24 0113   Resp wnl 03/09/24 0115   SpO2 100 % 03/09/24 0113       Anesthesia Post Evaluation    Patient location during evaluation: bedside  Patient participation: complete - patient participated  Level of consciousness: awake  Pain score: 0  Pain management: adequate  Airway patency: patent  Cardiovascular status: acceptable  Respiratory status: acceptable  Hydration status: acceptable  Postoperative Nausea and Vomiting: none        No notable events documented.

## 2024-03-09 NOTE — L&D DELIVERY NOTE
OB Delivery Note  3/9/2024  Isela Alvarado  33 y.o.   , Low Transverse        Gestational Age: 36w5d  /Para:   Quantitative Blood Loss: Admission to Discharge: 1065 mL (3/8/2024 11:35 AM - 3/9/2024  3:27 AM)    Date: 3/8/2024 - 3/9/2024  OR Location: [facility] [place] [facility] OB    Name: Isela Alvarado, : 1990, Age: 33 y.o., MRN: [id number], Sex: female    Diagnosis  * No Diagnosis Codes entered * * No Diagnosis Codes entered *     Procedures    * OBGYN Delivery  Section    Surgeons      * Apurva Osei - Primary    Resident/Fellow/Other Assistant:  Surgeon(s) and Role:    Procedure Summary  Anesthesia: * No anesthesia type entered *  ASA: III  Anesthesia Staff:   Anesthesiologist: Tami Meneses MD  C-AA: LOREN Aguilar  Anesthesia Resident: Nina Holloway MD  Estimated Blood Loss: 565mL  Intra-op Medications:   Medication Name Total Dose   fentaNYL (PF)-BUPivacaine - Epi 1.25 mcg/mL- 0.044 % epidural infusion 263.57 mL   lactated Ringer's infusion 3,502.08 mL   oxytocin (Pitocin) infusion in sodium chloride 0.9% 30 units/500 mL 6,352 saúl-units       Specimen: No specimens collected     Staff:   Circulator: Odette Davis RN  Scrub Person: Keri Busch     Indication: 32yo  undergoing TOLAC IOL for 36.5wga. S/p CRB and pitocin. Noted large volume of clot prior to AROM, measuring 300cc. Pt began reporting feeling chills, shaky. Additionally, difficulty tracing fetus, noted to be in 90s-100s. Decision made to move forward with urgent C/S given suspicion for possible uterine rupture.     Findings: Minimal adhesive disease within subcutaneous tissue. Fascia minimally adherent to underlying rectus muscles. Rectus muscles with minimal adhesions to peritoneum. JANNET noted to be intact with no sign of uterine window or rupture. Poorly developed lower uterine segment.  Fetal position noted to be transverse with head to maternal L, back up.  Normal appearing fallopian tubes and ovaries.  Amniotic fluid clear, female infant in transverse presentation.  Arcuate uterus noted on exteriorization of uterus for closure.    Procedure: Pt was taken to the OR where epidural anesthesia was re-dosed. She was then placed in the dorsal supine position with a left lateral tilt. A plummer catheter was placed, SCD’s were applied, and a vaginal prep was performed. A pre-procedure time out was performed. The pt was given a prophylactic dose of IV antibiotics. She was then prepped and draped in the usual sterile fashion. A Pfannenstiel skin incision was made with the scalpel through the skin. The incision was extended through the subcutaneous fat to the fascia sharply. The fascia was incised in the midline and extended bilaterally with blunt stretch. The superior aspect of the incision was grasped, tented up with Kocher clamps and the rectus muscle was dissected off with a combination of blunt and sharp dissection. The rectus muscles were  bluntly, taking care to avoid underlying viscera. The peritoneal cavity was entered at the time of rectus muscle separation. The bladder blade was inserted.     A low transverse uterine incision was made with the scalpel, the uterine cavity was entered, and the hysterotomy was extended bilaterally with cephalocaudal stretch. Transverse position was confirmed. The feet were brought to the level of the hysterotomy. The feet were delivered with gentle traction, the remainder of the body was delivered, back anteriorly. First the right then the left arms were delivered with the Lovset maneuvers, and then the fetal head was delivered with Smellie Veit maneuvers without difficulty. The cord was clamped and cut, and infant was handed off to awaiting nursing. A cord blood sample was collected. The placenta was then expressed. The uterus was exteriorized and cleared of all clot and debris. The uterine incision was repaired using a running locked stitch of 2-0 PDS. A second layer of the  same suture was placed in an imbricating fashion. An additional layer of the same suture was placed in a vertical imbricating fashion. A figure-of-eight stitch of the same suture was placed along the hysterotomy where bleeding was noted. Electrosurgery was used to cauterize serosal bleeders. Good hemostasis was noted.    The uterus was then placed back inside the pelvis and the gutters were cleared of all clots and debris. The hysterotomy was again evaluated and found to be hemostatic. Electrosurgery was used to cauterize small bleeders along the inferior edge of the R rectus muscle. The remaining underside of the fascia, the bladder, and the rectus muscles were also found to be hemostatic. The fascia was closed using a running stitch of looped 0-PDS. The subcutaneous layer was irrigated and small bleeders were cauterized. The skin was closed with 4-0 Monocryl.  All counts were correct and the patient tolerated the procedure well. Dr. Osei was present for the entire procedure. The patient and infant were taken back to the recovery room in stable condition.       Magdalena Alvarado [[id number]]      Labor Events    Rupture date/time: 3/9/2024 0002  Rupture type: Artificial  Fluid color: Clear  Fluid odor: None  Labor type: Induced Onset of Labor  Labor allowed to proceed with plans for an attempted vaginal birth?: Yes  Induction: Dukes/EASI, Oxytocin  First cervical ripening date/time: 3/8/2024 1600  Induction date/time: 3/8/2024 1135  Induction indications: Other  Complications: Abruptio Placenta, Other Excessive Bleeding       Labor Event Times    Labor onset date/time: 3/8/2024 1600  Decision date/time (emergent ): 3/8/2024 2342       Placenta    Placenta delivery date/time: 3/9/2024 0005  Placenta removal: Spontaneous  Placenta appearance: Intact  Placenta disposition: pathology       Cord    Vessels: 3 vessels  Complications: None  Delayed cord clamping?: No  Cord blood disposition: Lab, Discarded  Gases  sent?: Yes       Anesthesia    Method: Combined spinal-epidural       Operative Delivery    Forceps attempted?: No  Vacuum extractor attempted?: No       Shoulder Dystocia    Shoulder dystocia present?: No       [place] Delivery    Time head delivered: 3/9/2024 00:04:00  Birth date/time: 3/9/2024 00:04:00  Delivery type: , Low Transverse   categorization: repeat   priority: urgent  Indications for : Other (Add Comments), Repeat , Malpresentation  Incision type: low transverse  Complications: Abruptio Placenta, Other Excessive Bleeding       Resuscitation    Method: Suctioning, Tactile stimulation, Continuous positive airway pressure (CPAP), Supplemental oxygen, Positive pressure ventilation (PPV)       Apgars    Living status: Living  Apgar Component Scores:  1 min.:  5 min.:  10 min.:  15 min.:  20 min.:    Skin color:  0  1       Heart rate:  1  2       Reflex irritability:  0  2       Muscle tone:  1  2       Respiratory effort:  1  2       Total:  3  9       Apgars assigned by: RU KIM       Delivery Providers    Delivering clinician: Apurva Osei MD   Provider Role    Odette Davis RN Delivery Nurse    Daniela Briceño, RN Nursery Nurse    Alis Koroma MD Resident    Mary Lou Caballero MD Resident                 Alis Koroma MD

## 2024-03-09 NOTE — ANESTHESIA PROCEDURE NOTES
Epidural Block    Patient location during procedure: OB  Start time: 3/8/2024 9:15 PM  End time: 3/8/2024 9:24 PM  Reason for block: labor analgesia  Staffing  Performed: LOREN   Authorized by: Tami Meneses MD    Performed by: LOREN Aguilar    Preanesthetic Checklist  Completed: patient identified, IV checked, risks and benefits discussed, surgical consent, monitors and equipment checked, pre-op evaluation, timeout performed and sterile techniques followed  Block Timeout  RN/Licensed healthcare professional reads aloud to the Anesthesia provider and entire team: Patient identity, procedure with side and site, patient position, and as applicable the availability of implants/special equipment/special requirements.  Patient on coagulant treatment: no  Timeout performed at: 3/8/2024 9:15 PM  Block Placement  Patient position: sitting  Prep: ChloraPrep  Sterility prep: cap, drape, gloves and mask  Sedation level: no sedation  Patient monitoring: blood pressure, continuous pulse oximetry and heart rate  Approach: midline  Local numbing: lidocaine 1% to skin and subcutaneous tissues  Vertebral space: lumbar  Lumbar location: L3-L4  Epidural  Loss of resistance technique: saline  Guidance: landmark technique        Needle  Needle type: Tuohy   Needle gauge: 17  Needle insertion depth: 6 cm  Catheter type: multi-orifice  Catheter size: 19 G  Catheter at skin depth: 11 cm  Catheter securement method: clear occlusive dressing and liquid medical adhesive    Test dose: lidocaine 1.5% with epinephrine 1-to-200,000  Test dose: lidocaine 1.5% with epinephrine 1-to-200,000  Test dose result: no positive test dose    PCEA  Medication concentration used: 0.044% Bupivacaine with 1.25 mcg/mL Fentanyl and [id number] Epinephrine  Dose (mL): 10  Lockout (minutes): 15  1-Hour Limit (boluses/hr): 4  Basal Rate: 14        Assessment  Number of attempts: 1  Events: no positive test dose

## 2024-03-09 NOTE — CARE PLAN
Problem: Postpartum  Goal: Experiences normal postpartum course  Outcome: Progressing  Goal: Incisions, wounds, or drain sites healing without S/S of infection  Outcome: Progressing  Goal: No s/sx of hemorrhage  Outcome: Progressing  Goal: Minimal s/sx of HDP and BP<160/110  Outcome: Progressing     Problem: Pain - Adult  Goal: Verbalizes/displays adequate comfort level or baseline comfort level  Outcome: Progressing     Problem: Safety - Adult  Goal: Free from fall injury  Outcome: Progressing   The patient's goals for the shift include rest    The clinical goals for the shift include bp remains below 150/100 with meds

## 2024-03-10 PROCEDURE — 2500000002 HC RX 250 W HCPCS SELF ADMINISTERED DRUGS (ALT 637 FOR MEDICARE OP, ALT 636 FOR OP/ED)

## 2024-03-10 PROCEDURE — 2500000004 HC RX 250 GENERAL PHARMACY W/ HCPCS (ALT 636 FOR OP/ED)

## 2024-03-10 PROCEDURE — 2500000001 HC RX 250 WO HCPCS SELF ADMINISTERED DRUGS (ALT 637 FOR MEDICARE OP)

## 2024-03-10 PROCEDURE — 1210000001 HC SEMI-PRIVATE ROOM DAILY

## 2024-03-10 RX ADMIN — IBUPROFEN 600 MG: 600 TABLET, FILM COATED ORAL at 04:03

## 2024-03-10 RX ADMIN — ACETAMINOPHEN 975 MG: 325 TABLET ORAL at 09:52

## 2024-03-10 RX ADMIN — OXYCODONE HYDROCHLORIDE 10 MG: 5 TABLET ORAL at 11:30

## 2024-03-10 RX ADMIN — OXYCODONE HYDROCHLORIDE 10 MG: 5 TABLET ORAL at 22:08

## 2024-03-10 RX ADMIN — ACETAMINOPHEN 975 MG: 325 TABLET ORAL at 04:03

## 2024-03-10 RX ADMIN — ACETAMINOPHEN 975 MG: 325 TABLET ORAL at 15:41

## 2024-03-10 RX ADMIN — ENOXAPARIN SODIUM 40 MG: 100 INJECTION SUBCUTANEOUS at 17:36

## 2024-03-10 RX ADMIN — NIFEDIPINE 30 MG: 30 TABLET, FILM COATED, EXTENDED RELEASE ORAL at 07:10

## 2024-03-10 RX ADMIN — ACETAMINOPHEN 975 MG: 325 TABLET ORAL at 21:15

## 2024-03-10 RX ADMIN — IBUPROFEN 600 MG: 600 TABLET, FILM COATED ORAL at 21:15

## 2024-03-10 RX ADMIN — POLYETHYLENE GLYCOL 3350 17 G: 17 POWDER, FOR SOLUTION ORAL at 15:40

## 2024-03-10 RX ADMIN — OXYCODONE HYDROCHLORIDE 10 MG: 5 TABLET ORAL at 17:31

## 2024-03-10 RX ADMIN — IBUPROFEN 600 MG: 600 TABLET, FILM COATED ORAL at 15:41

## 2024-03-10 RX ADMIN — IBUPROFEN 600 MG: 600 TABLET, FILM COATED ORAL at 09:52

## 2024-03-10 ASSESSMENT — PAIN SCALES - GENERAL
PAINLEVEL_OUTOF10: 10 - WORST POSSIBLE PAIN
PAINLEVEL_OUTOF10: 5 - MODERATE PAIN
PAINLEVEL_OUTOF10: 10 - WORST POSSIBLE PAIN
PAINLEVEL_OUTOF10: 9
PAINLEVEL_OUTOF10: 4
PAINLEVEL_OUTOF10: 7
PAINLEVEL_OUTOF10: 9
PAINLEVEL_OUTOF10: 8
PAINLEVEL_OUTOF10: 10 - WORST POSSIBLE PAIN

## 2024-03-10 ASSESSMENT — PAIN DESCRIPTION - DESCRIPTORS
DESCRIPTORS: THROBBING;TENDER
DESCRIPTORS: DISCOMFORT;SORE;TENDER;THROBBING

## 2024-03-10 ASSESSMENT — PAIN DESCRIPTION - LOCATION
LOCATION: ABDOMEN

## 2024-03-10 ASSESSMENT — PAIN - FUNCTIONAL ASSESSMENT
PAIN_FUNCTIONAL_ASSESSMENT: 0-10

## 2024-03-10 NOTE — LACTATION NOTE
Lactation Consultant Note  Lactation Consultation  Reason for Consult: Follow-up assessment, Late  infant  Consultant Name: Candy Richardson, RN, IBCLC    Maternal Information       Maternal Assessment       Infant Assessment       Feeding Assessment  Unable to assess infant feeding at this time: Other (Comment) (Mother stated she had tried to wake the baby up to feed but  the baby wouldn't wake and the last feeding was at 7 am)    LATCH TOOL       Breast Pump  Pump: Hospital grade electric pump, Double breast pumping  Frequency: Other (comment) (encouraged her to pump every 3 hours)  Duration: Initiate phase  Breast Shield Size and Type: 21 mm  Units of Volume: mL per session    Other OB Lactation Tools  Lactation Tools: Flanges, Lanolin    Patient Follow-up  Inpatient Lactation Follow-up Needed : Yes  Outpatient Lactation Follow-up: Recommended    Other OB Lactation Documentation  Maternal Risk Factors: Hypertension,  delivery  Infant Risk Factors: Prematurity <37 weeks, Low birth weight <2500 g    Recommendations/Summary  I did not view a latch at this time. Mom stated she had attempted recently to waken the baby to feed but, the baby is sleepy and wouldn't wake. She stated the last good feeding was at 7 AM.   Reviewed the importance of feeding the baby 8-12 times in a 24 hour period wether it's at the breast or with pumped breast milk via bottle nipple.   Mom stated she did pump earlier and had gotten 10 Ml and fed it to the baby via bottle nipple. She stated she was using the syringe earlier but, did not want to use this for larger amounts. I agreed with her that using a bottle nipple was more reasonable if she needed to supplement her late  baby with pumped breast milk.   Reviewed again how breast feeding a late  infant is different that breast feeding a full term infant and the need for pumping in addition to latching.     Reviewed no stool in life and expectations in  output.   Set mom up to pump with 21 MM flanges.     Oriented mom to pump set up- use- and cleaning of pump parts.   Encouraged her to give the baby any pumped breast milk obtained after pumping.     Encouraged mom to breastfeed on demand with a goal of 8-12 feeds in a 24 hour period.   If baby is not showing feeding cues and it has been 3 hours since the last time the baby was fed or the last time the baby attempted to feed, encouraged her to place baby in skin to skin.    Encouraged her to pump every 3 hours (8-12 times in a 24 hour period) for 20 minutes on both breasts and to give the baby any pumped breast milk.    Questions answered.   Encouraged her to call for assistance with feeds/ pumping, if needed.     Report to Bedside RN.    10 (severe pain)

## 2024-03-10 NOTE — LACTATION NOTE
Lactation Consultant Note  Lactation Consultation       Maternal Information       Maternal Assessment       Infant Assessment       Feeding Assessment       LATCH TOOL       Breast Pump       Other OB Lactation Tools       Patient Follow-up       Other OB Lactation Documentation       Recommendations/Summary  Attempted to see mom, however she was asleep at this time. Dad was awake holding the baby. Encouraged Dad to have mom call out when she awakens/ next feed to see lactation.

## 2024-03-10 NOTE — PROGRESS NOTES
Postpartum Progress Note    Assessment/Plan   Isela Alvarado is a 33 y.o., , who delivered at 36w5d gestation    Now PPD#1 s/p , Low Transverse  on 3/9/2024   - continue routine postpartum care  - pain well controlled on po medications  - dvt risk score DVT Score: 8 , ppx with lovenox  - Hgb:   Results from last 7 days   Lab Units 24  0729 24  0151 24  1244   HEMOGLOBIN g/dL 10.6* 11.2* 13.2      cHTN  - stable on Nifed30 daily     Maternal Well-Being  - emotional support provided     Feeding  - breastfeeding/pumping encouraged; lactation consult prn    Contraception  - education provided  - POPs    Dispo  - Anticipate DC PPD3 pending BP control.  - The signs and symptoms of PEC were reviewed with the patient, including unrelenting headache, vision changes/blurred vision, and pain underneath the right breast.   - BP cuff for home for checking BP BID. Pt instructed to call primary OB if SBP > 160 or DBP > 110.   Follow up in 2-5 days for BP check with your OB provider., Follow up in 2 wks for incision check with your OB provider., and Follow up in 4-6 wk for postpartum visit with your OB provider.     Shandra Grimes PA-C  Postpartum Pager [phone]    Principal Problem:    Labor and delivery indication for care or intervention    Pregnancy Problems (from 23 to present)       Problem Noted Resolved    Labor and delivery indication for care or intervention 3/8/2024 by Kamini Salcedo MD No    Priority:  Medium      Ultrasound for  screening for fetal growth restriction 2023 by SERGIO Taveras No    Priority:  Medium      Overview Addendum 2023 10:25 AM by SERGIO Taveras     At 24.1 wks, EFW 3%ile, AC 2%ile, dopplers wnl; recommend weekly dopplers and BPP         Chronic hypertension affecting pregnancy 2023 by Irena Alberto MD No    Priority:  Medium      Overview Signed 2023  9:51 AM by Irena Alberto MD      Hx PP HTN with last pregnancy  On bASA daily  BP in treatment range at 21 weeks (normal at initial OB at 13 weeks)   Start Nifed 30mg daily  No baseline labs. Sent at 21 weeks.               Hospital course: chronic hypertension   section delivery  Patient is currently breastfeedingThe patient's blood type is O POS. The baby's blood type is O NEG . Rhogam is not indicated.    Subjective   Her pain is well controlled with current medications  She is passing flatus  She is ambulating well  She is tolerating a Adult diet Regular  She reports no breast or nursing problems  She denies emotional concerns today   Her plan for contraception is oral contraceptives     Pt seen at the bedside in NAD. Denies HA, N/V, RUQ pain, vision changes.   Denies CP, SOB, calf pain, fever, passage of large clots.     Objective   Allergies:   Patient has no known allergies.         Last Vitals:  Temp Pulse Resp BP MAP Pulse Ox   37.5 °C (99.5 °F) 95 20 125/87   96 %     Vitals Min/Max Last 24 Hours:  Temp  Min: 36.1 °C (97 °F)  Max: 37.5 °C (99.5 °F)  Pulse  Min: 91  Max: 101  Resp  Min: 18  Max: 20  BP  Min: 125/87  Max: 144/97    Intake/Output:   No intake or output data in the 24 hours ending 03/10/24 1902    Physical Exam:  General: Examination reveals a well developed, well nourished, female, in no acute distress. She is alert and cooperative.  HEENT: External ears normal. Nose normal, no erythema or discharge.  Neck: supple, no significant adenopathy.  Lungs: breathing even and unlabored   Cardiac: warm and well perfused .  Fundus: firm and below umbilicus. Lochia light  Extremities: no redness or tenderness in the calves or thighs, no edema.  Neurological: alert, oriented, normal speech, no focal findings or movement disorder noted.  Psychological: awake and alert; oriented to person, place, and time.  Skin: incision clean, dry, intact, well approximated, no redness, drainage, or warmth     Lab Data:  Labs in chart were  reviewed.

## 2024-03-10 NOTE — PROGRESS NOTES
Isela Alvarado is a 33 y.o., , who had a , Low Transverse  delivery on 3/9/2024  at 36w5d and is now POD1.    She had Neuraxial Anesthesia without immediate complications noted.       Patient is currently experiencing ongoing pain which is being addressed.     Vitals:    03/10/24 0758   BP: 131/80   Pulse: 91   Resp: 18   Temp: 36.1 °C (97 °F)   SpO2: 96%       Neuraxial site assessed. No visible redness or swelling. Pain acceptably controlled. Patient able to ambulate and move all extremities without difficulty. Able to void. No complaints of nausea/vomiting. Tolerating PO intake well. No s/sx of PDPH.     Neuraxial site without redness, drainage, swelling.  Neuromuscular block resolved.    Anesthesia will sign off     \Austin Charles DO  Anesthesiology PGY-2, CA-1

## 2024-03-10 NOTE — CARE PLAN
The patient's goals for the shift include pain score less than 8 for the remainder of stay.     The clinical goals for the shift include feed infant, or pump every three hours.   Problem: Postpartum  Goal: Experiences normal postpartum course  Outcome: Progressing  Goal: Incisions, wounds, or drain sites healing without S/S of infection  Outcome: Progressing  Goal: No s/sx of hemorrhage  Outcome: Progressing  Goal: Minimal s/sx of HDP and BP<160/110  Outcome: Progressing     Problem: Pain - Adult  Goal: Verbalizes/displays adequate comfort level or baseline comfort level  Outcome: Progressing     Problem: Safety - Adult  Goal: Free from fall injury  Outcome: Progressing

## 2024-03-11 PROBLEM — D62 ACUTE BLOOD LOSS ANEMIA: Status: ACTIVE | Noted: 2024-03-11

## 2024-03-11 LAB
ALBUMIN SERPL BCP-MCNC: 3.7 G/DL (ref 3.4–5)
ALP SERPL-CCNC: 92 U/L (ref 33–110)
ALT SERPL W P-5'-P-CCNC: 20 U/L (ref 7–45)
ANION GAP SERPL CALC-SCNC: 17 MMOL/L (ref 10–20)
AST SERPL W P-5'-P-CCNC: 24 U/L (ref 9–39)
BILIRUB SERPL-MCNC: 0.2 MG/DL (ref 0–1.2)
BLOOD EXPIRATION DATE: NORMAL
BLOOD EXPIRATION DATE: NORMAL
BUN SERPL-MCNC: 10 MG/DL (ref 6–23)
CALCIUM SERPL-MCNC: 9 MG/DL (ref 8.6–10.6)
CHLORIDE SERPL-SCNC: 100 MMOL/L (ref 98–107)
CO2 SERPL-SCNC: 24 MMOL/L (ref 21–32)
CREAT SERPL-MCNC: 0.58 MG/DL (ref 0.5–1.05)
DISPENSE STATUS: NORMAL
DISPENSE STATUS: NORMAL
EGFRCR SERPLBLD CKD-EPI 2021: >90 ML/MIN/1.73M*2
ERYTHROCYTE [DISTWIDTH] IN BLOOD BY AUTOMATED COUNT: 13.2 % (ref 11.5–14.5)
GLUCOSE SERPL-MCNC: 125 MG/DL (ref 74–99)
GP B STREP GENITAL QL CULT: NORMAL
HCT VFR BLD AUTO: 34.9 % (ref 36–46)
HGB BLD-MCNC: 11.2 G/DL (ref 12–16)
MCH RBC QN AUTO: 29.2 PG (ref 26–34)
MCHC RBC AUTO-ENTMCNC: 32.1 G/DL (ref 32–36)
MCV RBC AUTO: 91 FL (ref 80–100)
NRBC BLD-RTO: 0 /100 WBCS (ref 0–0)
PLATELET # BLD AUTO: 252 X10*3/UL (ref 150–450)
POTASSIUM SERPL-SCNC: 3.5 MMOL/L (ref 3.5–5.3)
PRODUCT BLOOD TYPE: 5100
PRODUCT BLOOD TYPE: 5100
PRODUCT CODE: NORMAL
PRODUCT CODE: NORMAL
PROT SERPL-MCNC: 6.8 G/DL (ref 6.4–8.2)
RBC # BLD AUTO: 3.84 X10*6/UL (ref 4–5.2)
SODIUM SERPL-SCNC: 137 MMOL/L (ref 136–145)
UNIT ABO: NORMAL
UNIT ABO: NORMAL
UNIT NUMBER: NORMAL
UNIT NUMBER: NORMAL
UNIT RH: NORMAL
UNIT RH: NORMAL
UNIT VOLUME: 350
UNIT VOLUME: 350
WBC # BLD AUTO: 13.6 X10*3/UL (ref 4.4–11.3)
XM INTEP: NORMAL
XM INTEP: NORMAL

## 2024-03-11 PROCEDURE — 36415 COLL VENOUS BLD VENIPUNCTURE: CPT | Performed by: NURSE PRACTITIONER

## 2024-03-11 PROCEDURE — 2500000004 HC RX 250 GENERAL PHARMACY W/ HCPCS (ALT 636 FOR OP/ED)

## 2024-03-11 PROCEDURE — 2500000002 HC RX 250 W HCPCS SELF ADMINISTERED DRUGS (ALT 637 FOR MEDICARE OP, ALT 636 FOR OP/ED)

## 2024-03-11 PROCEDURE — 80053 COMPREHEN METABOLIC PANEL: CPT | Performed by: NURSE PRACTITIONER

## 2024-03-11 PROCEDURE — 85027 COMPLETE CBC AUTOMATED: CPT | Performed by: NURSE PRACTITIONER

## 2024-03-11 PROCEDURE — 1100000001 HC PRIVATE ROOM DAILY

## 2024-03-11 PROCEDURE — 2500000001 HC RX 250 WO HCPCS SELF ADMINISTERED DRUGS (ALT 637 FOR MEDICARE OP)

## 2024-03-11 PROCEDURE — 2500000005 HC RX 250 GENERAL PHARMACY W/O HCPCS

## 2024-03-11 PROCEDURE — 2500000002 HC RX 250 W HCPCS SELF ADMINISTERED DRUGS (ALT 637 FOR MEDICARE OP, ALT 636 FOR OP/ED): Performed by: STUDENT IN AN ORGANIZED HEALTH CARE EDUCATION/TRAINING PROGRAM

## 2024-03-11 RX ORDER — NIFEDIPINE 30 MG/1
30 TABLET, FILM COATED, EXTENDED RELEASE ORAL ONCE
Status: COMPLETED | OUTPATIENT
Start: 2024-03-11 | End: 2024-03-11

## 2024-03-11 RX ORDER — NIFEDIPINE 60 MG/1
60 TABLET, FILM COATED, EXTENDED RELEASE ORAL
Status: DISCONTINUED | OUTPATIENT
Start: 2024-03-12 | End: 2024-03-12 | Stop reason: HOSPADM

## 2024-03-11 RX ADMIN — POLYETHYLENE GLYCOL 3350 17 G: 17 POWDER, FOR SOLUTION ORAL at 14:32

## 2024-03-11 RX ADMIN — ACETAMINOPHEN 975 MG: 325 TABLET ORAL at 03:52

## 2024-03-11 RX ADMIN — IBUPROFEN 600 MG: 600 TABLET, FILM COATED ORAL at 10:21

## 2024-03-11 RX ADMIN — IBUPROFEN 600 MG: 600 TABLET, FILM COATED ORAL at 23:02

## 2024-03-11 RX ADMIN — POLYETHYLENE GLYCOL 3350 17 G: 17 POWDER, FOR SOLUTION ORAL at 01:14

## 2024-03-11 RX ADMIN — IBUPROFEN 600 MG: 600 TABLET, FILM COATED ORAL at 16:28

## 2024-03-11 RX ADMIN — SIMETHICONE 80 MG: 80 TABLET, CHEWABLE ORAL at 14:32

## 2024-03-11 RX ADMIN — LIDOCAINE 1 PATCH: 4 PATCH TOPICAL at 01:14

## 2024-03-11 RX ADMIN — IBUPROFEN 600 MG: 600 TABLET, FILM COATED ORAL at 03:52

## 2024-03-11 RX ADMIN — ACETAMINOPHEN 975 MG: 325 TABLET ORAL at 10:21

## 2024-03-11 RX ADMIN — ENOXAPARIN SODIUM 40 MG: 100 INJECTION SUBCUTANEOUS at 14:32

## 2024-03-11 RX ADMIN — NIFEDIPINE 30 MG: 30 TABLET, FILM COATED, EXTENDED RELEASE ORAL at 20:51

## 2024-03-11 RX ADMIN — NIFEDIPINE 30 MG: 30 TABLET, FILM COATED, EXTENDED RELEASE ORAL at 06:50

## 2024-03-11 RX ADMIN — OXYCODONE HYDROCHLORIDE 5 MG: 5 TABLET ORAL at 18:06

## 2024-03-11 RX ADMIN — SIMETHICONE 80 MG: 80 TABLET, CHEWABLE ORAL at 05:11

## 2024-03-11 RX ADMIN — ACETAMINOPHEN 975 MG: 325 TABLET ORAL at 16:28

## 2024-03-11 RX ADMIN — ACETAMINOPHEN 975 MG: 325 TABLET ORAL at 23:02

## 2024-03-11 ASSESSMENT — PAIN SCALES - GENERAL
PAINLEVEL_OUTOF10: 8
PAINLEVEL_OUTOF10: 10 - WORST POSSIBLE PAIN
PAINLEVEL_OUTOF10: 7
PAINLEVEL_OUTOF10: 8

## 2024-03-11 ASSESSMENT — PAIN - FUNCTIONAL ASSESSMENT
PAIN_FUNCTIONAL_ASSESSMENT: 0-10
PAIN_FUNCTIONAL_ASSESSMENT: 0-10

## 2024-03-11 ASSESSMENT — PAIN DESCRIPTION - LOCATION: LOCATION: ABDOMEN

## 2024-03-11 ASSESSMENT — PAIN DESCRIPTION - DESCRIPTORS: DESCRIPTORS: DISCOMFORT;ACHING

## 2024-03-11 NOTE — PROGRESS NOTES
Postpartum Progress Note    Assessment/Plan     Isela Alvarado is a 33 y.o., , who initially presented for IOL ISO FGR and cHTN  She had a , Low Transverse  delivery on 3/9/2024  at 36w5d and is now POD2.      cHTN  - Nifedipine 30mg daily  - asymptomatic  - 1 moderate BP reading today 146/100  - HELLP labs WNL on admission  - hyponatremia 131 on admission  - repeat CMP    #Post-op , Low Transverse   - continue routine postoperative care  - pain well controlled on ERAS protocol  - Hg 11.2 --> EBL 1113cc  --> 10.6  - pulse ranging from    - repeat CBC to monitor  - DVT Score: 8 enoxaparin prophylactic dose daily while inpatient  - The patient's blood type is O POS. The baby's blood type is O NEG . Rhogam is not indicated.    #Contraception  - POPs    #Maternal Well-Being  - emotional support provided  - bonding with infant  - Reagan feeding: breastfeeding/pumping encouraged; lactation consult prn     #Dispo  - anticipate d/c on POD #3 if meeting all post-op and postpartum milestones  - The signs and symptoms of PEC were reviewed with the patient, including unrelenting headache, vision changes/blurred vision, and pain underneath the right breast.   - BP cuff for home for checking BP BID. Pt instructed to call primary OB if SBP > 160 or DBP > 110.   Follow up in 2-5 days for BP check with your OB provider., Follow up in 2 wks for incision check with your OB provider., and Follow up in 4-6 wk for postpartum visit with your OB provider.        Principal Problem:    Labor and delivery indication for care or intervention      Subjective   Her pain is well controlled with current medications  She is passing flatus  She is ambulating independently  She is tolerating a Adult diet Regular  She is voiding spontaneously  She reports no breast or nursing problems  She denies emotional concerns today     Pt seen while sitting at side of bed eating lunch. FOB at bedside.      Objective   Last  Vitals:  Temp Pulse Resp BP MAP Pulse Ox   37.3 °C (99.1 °F) 102 16 (!) 146/100   100 %     Vitals Min/Max Last 24 Hours:  Temp  Min: 36 °C (96.8 °F)  Max: 37.5 °C (99.5 °F)  Pulse  Min: 84  Max: 102  Resp  Min: 16  Max: 20  BP  Min: 113/72  Max: 146/100    Intake/Output:   No intake or output data in the 24 hours ending 03/11/24 1350    Physical Exam:  General: well appearing, well nourished, postpartum  Obstetric: fundus firm below umbilicus, lochia light  Abdominal: incision well approximated without drainage or surrounding erythema  Skin: Warm, dry; no rashes/lesions/erythema  Neuro: A/Ox3, no gross motor deficit   GI: no distension, appropriately tender, soft  Respiratory: Even and unlabored on RA  Cardiovascular: No edema, erythema, warmth  Psych: appropriate mood and affect    Lab Data:  Lab Results   Component Value Date    WBC 16.5 (H) 03/09/2024    HGB 10.6 (L) 03/09/2024    HCT 30.5 (L) 03/09/2024     03/09/2024

## 2024-03-11 NOTE — SIGNIFICANT EVENT
Patient meets criteria for home monitoring of blood pressure post discharge.  Reason: past medical history of chronic hypertension. Met with patient to assess for availability of home BP monitor.  Patient stated she owns home BP monitor. . Patient educated on importance of continuing to monitor BP at home, recording BP on home monitoring log and s/sx of when to call her provider.  Pt verbalized understanding the above information.

## 2024-03-11 NOTE — LACTATION NOTE
"Lactation Consultant Note  Lactation Consultation       Maternal Information       Maternal Assessment       Infant Assessment       Feeding Assessment       LATCH TOOL       Breast Pump       Other OB Lactation Tools       Patient Follow-up       Other OB Lactation Documentation       Recommendations/Summary  I did not view a latch at this time.   Mom stated the baby was at x-ray at this time. She did vocalize that the baby did stool x 2 since birth but, the Peds were still wanting to \"check to make sure everything was okay\".     Mom stated she continues to latch the baby to the breast and pump and her milk volume is increasing. Her last pumping volume was 30 ML and she was excited about this.   She is giving the pumped breast milk that she obtains to the baby via bottle nipple.     Encouraged mom to continue to do what she is doing with latching the baby at the breast and supplementing with her pumped breast milk.     Encouraged her to call out for assistance with feeds, if needed.     Mom verbalized understanding    Has a breast pump for at home.     Encouraged her to utilize the outpatient lactation resources, if needed.   Contact information given.   [phone] Heather or [phone] Rafi  .     "

## 2024-03-12 VITALS
OXYGEN SATURATION: 95 % | TEMPERATURE: 97.7 F | WEIGHT: 183.64 LBS | SYSTOLIC BLOOD PRESSURE: 118 MMHG | HEIGHT: 62 IN | BODY MASS INDEX: 33.79 KG/M2 | DIASTOLIC BLOOD PRESSURE: 78 MMHG | RESPIRATION RATE: 16 BRPM | HEART RATE: 110 BPM

## 2024-03-12 PROCEDURE — 99231 SBSQ HOSP IP/OBS SF/LOW 25: CPT | Performed by: NURSE PRACTITIONER

## 2024-03-12 PROCEDURE — 2500000004 HC RX 250 GENERAL PHARMACY W/ HCPCS (ALT 636 FOR OP/ED)

## 2024-03-12 PROCEDURE — 2500000001 HC RX 250 WO HCPCS SELF ADMINISTERED DRUGS (ALT 637 FOR MEDICARE OP)

## 2024-03-12 PROCEDURE — 2500000002 HC RX 250 W HCPCS SELF ADMINISTERED DRUGS (ALT 637 FOR MEDICARE OP, ALT 636 FOR OP/ED): Performed by: STUDENT IN AN ORGANIZED HEALTH CARE EDUCATION/TRAINING PROGRAM

## 2024-03-12 RX ORDER — OXYCODONE HYDROCHLORIDE 5 MG/1
5 TABLET ORAL EVERY 6 HOURS PRN
Qty: 5 TABLET | Refills: 0 | Status: SHIPPED | OUTPATIENT
Start: 2024-03-12 | End: 2024-03-17

## 2024-03-12 RX ORDER — DOCUSATE SODIUM 100 MG/1
100 CAPSULE, LIQUID FILLED ORAL 2 TIMES DAILY PRN
Qty: 60 CAPSULE | Refills: 0 | Status: SHIPPED | OUTPATIENT
Start: 2024-03-12 | End: 2024-04-11

## 2024-03-12 RX ORDER — ACETAMINOPHEN 500 MG
1000 TABLET ORAL EVERY 6 HOURS PRN
Qty: 120 TABLET | Refills: 0 | Status: SHIPPED | OUTPATIENT
Start: 2024-03-12

## 2024-03-12 RX ORDER — NIFEDIPINE 60 MG/1
60 TABLET, FILM COATED, EXTENDED RELEASE ORAL
Qty: 30 TABLET | Refills: 1 | Status: SHIPPED | OUTPATIENT
Start: 2024-03-13 | End: 2024-05-12

## 2024-03-12 RX ORDER — IBUPROFEN 600 MG/1
600 TABLET ORAL EVERY 6 HOURS PRN
Qty: 120 TABLET | Refills: 0 | Status: SHIPPED | OUTPATIENT
Start: 2024-03-12 | End: 2024-04-11

## 2024-03-12 RX ADMIN — IBUPROFEN 600 MG: 600 TABLET, FILM COATED ORAL at 11:26

## 2024-03-12 RX ADMIN — POLYETHYLENE GLYCOL 3350 17 G: 17 POWDER, FOR SOLUTION ORAL at 11:27

## 2024-03-12 RX ADMIN — IBUPROFEN 600 MG: 600 TABLET, FILM COATED ORAL at 05:22

## 2024-03-12 RX ADMIN — NIFEDIPINE 60 MG: 60 TABLET, EXTENDED RELEASE ORAL at 07:38

## 2024-03-12 RX ADMIN — ACETAMINOPHEN 975 MG: 325 TABLET ORAL at 11:26

## 2024-03-12 RX ADMIN — OXYCODONE HYDROCHLORIDE 5 MG: 5 TABLET ORAL at 11:25

## 2024-03-12 RX ADMIN — ACETAMINOPHEN 975 MG: 325 TABLET ORAL at 05:22

## 2024-03-12 ASSESSMENT — PAIN - FUNCTIONAL ASSESSMENT: PAIN_FUNCTIONAL_ASSESSMENT: 0-10

## 2024-03-12 ASSESSMENT — PAIN SCALES - GENERAL
PAINLEVEL_OUTOF10: 7
PAINLEVEL_OUTOF10: 8

## 2024-03-12 ASSESSMENT — PAIN DESCRIPTION - DESCRIPTORS: DESCRIPTORS: DISCOMFORT;SORE

## 2024-03-12 NOTE — DISCHARGE SUMMARY
Discharge Summary    Admission Date: 3/8/2024  Discharge Date: 24  Discharge Diagnosis: Labor and delivery indication for care or intervention     Patient Active Problem List   Diagnosis    Uterine scar from previous  delivery    Chronic hypertension affecting pregnancy    Ultrasound for  screening for fetal growth restriction    Labor and delivery indication for care or intervention    Acute blood loss anemia       Hospital Course  Isela Alvarado is a 33 y.o.,     Initially presented for: IOL ISO FGR and cHTN    Admission Date: 3/8/2024    Delivery Date: 3/9/2024  12:04 AM     Delivery type: , Low Transverse      GA at delivery: 36w5d    Outcome: Living     Anesthesia during delivery: Combined spinal-epidural     Intrapartum complications: Abruptio Placenta;Other Excessive Bleeding     Feeding method: Breastfeeding Status: Yes    Contraception: POPs We discussed the need to take the pill at exactly the same time everyday and if >2hrs late a backup method must be used for 1 week. Pt verbalized understanding.     Rhogam: The patient's blood type is O POS. The baby's blood type is O NEG . Rhogam is not indicated.     Now postpartum day: 3.    Hospital course n/f:    cHTN  - Nifedipine 30mg daily increased to 60mg daily; hold for symptomatic hypotension   - asymptomatic  - HELLP labs WNL on admission      PP course otherwise uneventful.  Meeting all postpartum milestones- ambulating independently, passing flatus, tolerating PO intake, lochia light, voiding spontaneously, and pain well controlled with PO meds.     Dispo  OK for DC today  - The signs and symptoms of PEC were reviewed with the patient, including unrelenting headache, vision changes/blurred vision, and RUQ pain  - BP cuff for home for checking BP twice a day  - Pt instructed to call primary OB if SBP > 160 or DBP > 110 or if development of PEC symptoms   - On discharge, follow up with primary OB in:       - 2-5 days  for BP check       - 2 weeks for incision check       - 4-6 week for post-partum visit     Pertinent Physical Exam At Time of Discharge  General: well appearing, well nourished, postpartum  Obstetric: fundus firm below umbilicus, lochia light  Skin: Warm, dry; no rashes/lesions/erythema; Pfannenstiel incision: clean, dry, well approximated, open to air, negative discharge/warmth/erythema   Breast: No masses, nipple discharge  Neuro: A/Ox3, conversational, no gross motor deficit   GI: no distension, appropriately tender, soft, +BS  Respiratory: Even and unlabored on RA, LSCTA BL  Cardiovascular: Trace BLE edema; No erythema, warmth  Psych: appropriate mood and affect       Your medication list        CHANGE how you take these medications        Instructions Last Dose Given Next Dose Due   NIFEdipine ER 60 mg 24 hr tablet  Commonly known as: Adalat CC  Start taking on: March 13, 2024  What changed:   medication strength  how much to take      Take 1 tablet (60 mg) by mouth once daily in the morning. Take before meals. Do not crush, chew, or split. Do not start before March 13, 2024.              CONTINUE taking these medications        Instructions Last Dose Given Next Dose Due   Classic Prenatal 28 mg iron-800 mcg folic acid tablet tablet  Generic drug: prenatal vitamin      Take 1 tablet by mouth once daily.       folic acid 400 mcg tablet  Commonly known as: Folvite           Topicort 0.05 % ointment  Generic drug: desoximetasone                  STOP taking these medications      acetaminophen 325 mg tablet  Commonly known as: Tylenol        aspirin 81 mg chewable tablet        docusate sodium 100 mg capsule  Commonly known as: Colace        Tylenol Extra Strength 500 mg tablet  Generic drug: acetaminophen                  Where to Get Your Medications        These medications were sent to [id number] - [place], [place] - [address]  [address] [facility] [address]       Phone: [phone]   NIFEdipine ER 60 mg 24 hr tablet           Outpatient Follow-Up  Future Appointments   Date Time Provider Department Center   3/21/2024  1:00 PM HUGO Dejesus [id number] [facility]   5/2/2024  3:00 PM Idania Aguirre MD [id number] [facility]       JESUS De Dios-CNP

## 2024-03-13 ENCOUNTER — APPOINTMENT (OUTPATIENT)
Dept: OBSTETRICS AND GYNECOLOGY | Facility: CLINIC | Age: 34
End: 2024-03-13
Payer: COMMERCIAL

## 2024-03-13 ENCOUNTER — APPOINTMENT (OUTPATIENT)
Dept: RADIOLOGY | Facility: CLINIC | Age: 34
End: 2024-03-13
Payer: COMMERCIAL

## 2024-03-13 LAB
LABORATORY COMMENT REPORT: NORMAL
PATH REPORT.FINAL DX SPEC: NORMAL
PATH REPORT.GROSS SPEC: NORMAL
PATH REPORT.RELEVANT HX SPEC: NORMAL
PATH REPORT.TOTAL CANCER: NORMAL

## 2024-03-19 ENCOUNTER — APPOINTMENT (OUTPATIENT)
Dept: OBSTETRICS AND GYNECOLOGY | Facility: CLINIC | Age: 34
End: 2024-03-19
Payer: COMMERCIAL

## 2024-03-20 ENCOUNTER — APPOINTMENT (OUTPATIENT)
Dept: RADIOLOGY | Facility: CLINIC | Age: 34
End: 2024-03-20
Payer: COMMERCIAL

## 2024-03-20 ENCOUNTER — APPOINTMENT (OUTPATIENT)
Dept: OBSTETRICS AND GYNECOLOGY | Facility: CLINIC | Age: 34
End: 2024-03-20
Payer: COMMERCIAL

## 2024-03-21 ENCOUNTER — APPOINTMENT (OUTPATIENT)
Dept: OBSTETRICS AND GYNECOLOGY | Facility: CLINIC | Age: 34
End: 2024-03-21
Payer: COMMERCIAL

## 2024-03-28 ENCOUNTER — APPOINTMENT (OUTPATIENT)
Dept: RADIOLOGY | Facility: CLINIC | Age: 34
End: 2024-03-28
Payer: COMMERCIAL

## 2024-03-28 ENCOUNTER — APPOINTMENT (OUTPATIENT)
Dept: RADIOLOGY | Facility: HOSPITAL | Age: 34
End: 2024-03-28
Payer: COMMERCIAL

## 2024-03-28 ENCOUNTER — APPOINTMENT (OUTPATIENT)
Dept: OBSTETRICS AND GYNECOLOGY | Facility: CLINIC | Age: 34
End: 2024-03-28
Payer: COMMERCIAL

## 2024-04-04 ENCOUNTER — OFFICE VISIT (OUTPATIENT)
Dept: OBSTETRICS AND GYNECOLOGY | Facility: CLINIC | Age: 34
End: 2024-04-04
Payer: COMMERCIAL

## 2024-04-04 VITALS
BODY MASS INDEX: 30.84 KG/M2 | SYSTOLIC BLOOD PRESSURE: 128 MMHG | HEIGHT: 62 IN | WEIGHT: 167.6 LBS | DIASTOLIC BLOOD PRESSURE: 88 MMHG

## 2024-04-04 PROBLEM — O10.919 CHRONIC HYPERTENSION AFFECTING PREGNANCY (HHS-HCC): Status: RESOLVED | Noted: 2023-11-20 | Resolved: 2024-04-04

## 2024-04-04 PROCEDURE — 99214 OFFICE O/P EST MOD 30 MIN: CPT | Performed by: OBSTETRICS & GYNECOLOGY

## 2024-04-04 PROCEDURE — 1036F TOBACCO NON-USER: CPT | Performed by: OBSTETRICS & GYNECOLOGY

## 2024-04-04 PROCEDURE — 3074F SYST BP LT 130 MM HG: CPT | Performed by: OBSTETRICS & GYNECOLOGY

## 2024-04-04 PROCEDURE — 3079F DIAST BP 80-89 MM HG: CPT | Performed by: OBSTETRICS & GYNECOLOGY

## 2024-04-04 ASSESSMENT — EDINBURGH POSTNATAL DEPRESSION SCALE (EPDS)
I HAVE FELT SAD OR MISERABLE: NO, NOT AT ALL
I HAVE FELT SCARED OR PANICKY FOR NO GOOD REASON: NO, NOT AT ALL
I HAVE BEEN ANXIOUS OR WORRIED FOR NO GOOD REASON: NO, NOT AT ALL
THE THOUGHT OF HARMING MYSELF HAS OCCURRED TO ME: NEVER
I HAVE BEEN ABLE TO LAUGH AND SEE THE FUNNY SIDE OF THINGS: AS MUCH AS I ALWAYS COULD
I HAVE BLAMED MYSELF UNNECESSARILY WHEN THINGS WENT WRONG: NO, NEVER
TOTAL SCORE: 0
THINGS HAVE BEEN GETTING ON TOP OF ME: NO, I HAVE BEEN COPING AS WELL AS EVER
I HAVE BEEN SO UNHAPPY THAT I HAVE HAD DIFFICULTY SLEEPING: NOT AT ALL
I HAVE LOOKED FORWARD WITH ENJOYMENT TO THINGS: AS MUCH AS I EVER DID
I HAVE BEEN SO UNHAPPY THAT I HAVE BEEN CRYING: NO, NEVER

## 2024-04-04 NOTE — PROGRESS NOTES
Isela Alvarado is a 33 y.o. female who presents with a chief complaint of Postpartum Care (3 wks )      SUBJECTIVE  Patient presents 3 weeks postpartum .  She is doing well denies any problems at this time.  Her blood pressure was normal so organ to try her off her blood pressure medicine to see if she continues to be in the normal range.  She is in a call if she is greater than 140/90 and her home blood pressure cuff.  She has no headaches blurry vision or spots.  Her incisions clean dry without erythema or drainage.  She is breast-feeding.  She denies any depression, suicidal homicidal ideations or plan.    Past Medical History:   Diagnosis Date    Atopic dermatitis 2015    Bacterial vaginosis 10/14/2023    Dysmenorrhea 2005    Lactose intolerance 2009    Other ovarian cyst, unspecified side 2008    Formatting of this note might be different from the original. Noted on exam.  Repeat exam planned in 3 weeks.  Nonpainful on the L.    Postpartum hypertension 10/14/2023    Seasonal allergies 2016    Trichomonas infection 10/14/2023    Yeast vaginitis 10/14/2023     Past Surgical History:   Procedure Laterality Date     SECTION, LOW TRANSVERSE  06/15/2022    2024     Social History     Socioeconomic History    Marital status: Single     Spouse name: None    Number of children: None    Years of education: None    Highest education level: None   Occupational History    None   Tobacco Use    Smoking status: Never    Smokeless tobacco: Never   Vaping Use    Vaping Use: Never used   Substance and Sexual Activity    Alcohol use: Not Currently    Drug use: Never    Sexual activity: Not Currently   Other Topics Concern    None   Social History Narrative    None     Social Determinants of Health     Financial Resource Strain: Low Risk  (3/8/2024)    Overall Financial Resource Strain (CARDIA)     Difficulty of Paying Living Expenses: Not hard at all   Food Insecurity: No  Food Insecurity (3/8/2024)    Hunger Vital Sign     Worried About Running Out of Food in the Last Year: Never true     Ran Out of Food in the Last Year: Never true   Transportation Needs: No Transportation Needs (3/8/2024)    PRAPARE - Transportation     Lack of Transportation (Medical): No     Lack of Transportation (Non-Medical): No   Physical Activity: Sufficiently Active (3/8/2024)    Exercise Vital Sign     Days of Exercise per Week: 5 days     Minutes of Exercise per Session: 50 min   Stress: No Stress Concern Present (3/8/2024)    [employer] - Occupational Stress Questionnaire     Feeling of Stress : Only a little   Social Connections: Not on file   Intimate Partner Violence: Not on file     No family history on file.    OB History    Para Term  AB Living   3 3 2 1   3   SAB IAB Ectopic Multiple Live Births         0 3      # Outcome Date GA Lbr Calin/2nd Weight Sex Delivery Anes PTL Lv   3  24 36w5d  2.02 kg F CS-LTranv CSE  LEONA      Complications: Abruptio Placenta, Other Excessive Bleeding   2 Term 06/15/22 38w6d  3.147 kg M CS-LTranv EPI N LEONA   1 Term 10/05/12 38w0d  3.289 kg M Vag-Spont  N LEONA       OBJECTIVE  No Known Allergies   (Not in a hospital admission)       Review of Systems  History obtained from the patient  General ROS: negative  Psychological ROS: negative  Gastrointestinal ROS: negative  Musculoskeletal ROS: negative  Physical Exam  General Appearance: awake, alert, oriented, in no acute distress, well developed, well nourished, and in no acute distress  Skin: there are no suspicious lesions or rashes of concern, skin color, texture, turgor are normal; there are no bruises, rashes or lesions.  Head/Face: NCAT  Eyes: No gross abnormalities., PERRL, and EOMI  Neck: neck- supple, no mass, non-tender  Back: no pain to palpation  Abdomen: Soft, non-tender, normal bowel sounds; no bruits, organomegaly or masses.  Extremities: Extremities  "warm to touch, pink, with no edema.  Musculoskeletal: negative    /88   Ht 1.575 m (5' 2\")   Wt 76 kg (167 lb 9.6 oz)   Breastfeeding Yes   BMI 30.65 kg/m²    Problem List Items Addressed This Visit    None  Visit Diagnoses       Postpartum hypertension    -  Primary         Follow up 1 week        "

## 2024-04-11 ENCOUNTER — APPOINTMENT (OUTPATIENT)
Dept: OBSTETRICS AND GYNECOLOGY | Facility: CLINIC | Age: 34
End: 2024-04-11
Payer: COMMERCIAL

## 2024-04-19 DIAGNOSIS — Z34.92 PRENATAL CARE IN SECOND TRIMESTER (HHS-HCC): ICD-10-CM

## 2024-04-19 RX ORDER — PRENATAL VIT NO.126/IRON/FOLIC 28MG-0.8MG
1 TABLET ORAL DAILY
Qty: 90 TABLET | Refills: 3 | Status: SHIPPED | OUTPATIENT
Start: 2024-04-19 | End: 2025-04-19

## 2024-04-23 ENCOUNTER — OFFICE VISIT (OUTPATIENT)
Dept: OBSTETRICS AND GYNECOLOGY | Facility: CLINIC | Age: 34
End: 2024-04-23
Payer: COMMERCIAL

## 2024-04-23 VITALS
BODY MASS INDEX: 30.36 KG/M2 | HEIGHT: 62 IN | SYSTOLIC BLOOD PRESSURE: 128 MMHG | WEIGHT: 165 LBS | DIASTOLIC BLOOD PRESSURE: 98 MMHG

## 2024-04-23 PROCEDURE — 99214 OFFICE O/P EST MOD 30 MIN: CPT | Performed by: OBSTETRICS & GYNECOLOGY

## 2024-04-23 NOTE — PROGRESS NOTES
Isela Alvarado is a 33 y.o. female who presents with a chief complaint of blood pressure - check after c section delivery  (3/9/2024 c section)      SUBJECTIVE  Patient presents 6 weeks postpartum vaginal delivery.  She is doing well denies any problems.  She is breast-feeding.  She would like to go on oral contraception and that was called in.  She denies any headaches blurry vision or spots.  Her blood pressure today is mild but she has no symptoms and is doing well.  She has no depression, suicidal homicidal ideations or plan.  She is happy with her delivery    Past Medical History:   Diagnosis Date    Atopic dermatitis 2015    Bacterial vaginosis 10/14/2023    Dysmenorrhea 2005    Lactose intolerance 2009    Other ovarian cyst, unspecified side 2008    Formatting of this note might be different from the original. Noted on exam.  Repeat exam planned in 3 weeks.  Nonpainful on the L.    Postpartum hypertension ([facility]) 10/14/2023    Seasonal allergies 2016    Trichomonas infection 10/14/2023    Yeast vaginitis 10/14/2023     Past Surgical History:   Procedure Laterality Date     SECTION, LOW TRANSVERSE  06/15/2022    2024     Social History     Socioeconomic History    Marital status: Single     Spouse name: Not on file    Number of children: Not on file    Years of education: Not on file    Highest education level: Not on file   Occupational History    Not on file   Tobacco Use    Smoking status: Never    Smokeless tobacco: Never   Vaping Use    Vaping status: Never Used   Substance and Sexual Activity    Alcohol use: Not Currently    Drug use: Never    Sexual activity: Not Currently   Other Topics Concern    Not on file   Social History Narrative    Not on file     Social Determinants of Health     Financial Resource Strain: Low Risk  (3/8/2024)    Overall Financial Resource Strain (CARDIA)     Difficulty of Paying Living Expenses: Not hard at all   Food Insecurity: No  Food Insecurity (3/8/2024)    Hunger Vital Sign     Worried About Running Out of Food in the Last Year: Never true     Ran Out of Food in the Last Year: Never true   Transportation Needs: No Transportation Needs (3/8/2024)    PRAPARE - Transportation     Lack of Transportation (Medical): No     Lack of Transportation (Non-Medical): No   Physical Activity: Sufficiently Active (3/8/2024)    Exercise Vital Sign     Days of Exercise per Week: 5 days     Minutes of Exercise per Session: 50 min   Stress: No Stress Concern Present (3/8/2024)    [employer] [place] [employer] - Occupational Stress Questionnaire     Feeling of Stress : Only a little   Social Connections: Not on file   Intimate Partner Violence: Not on file     No family history on file.    OB History    Para Term  AB Living   3 3 2 1   3   SAB IAB Ectopic Multiple Live Births         0 3      # Outcome Date GA Lbr Calin/2nd Weight Sex Delivery Anes PTL Lv   3  24 36w5d  2.02 kg F CS-LTranv CSE  LEONA      Complications: Abruptio Placenta, Other Excessive Bleeding   2 Term 06/15/22 38w6d  3.147 kg M CS-LTranv EPI N LEONA   1 Term 10/05/12 38w0d  3.289 kg M Vag-Spont  N LEONA       OBJECTIVE  No Known Allergies   (Not in a hospital admission)       Review of Systems  History obtained from the patient  General ROS: negative  Psychological ROS: negative  Gastrointestinal ROS: negative  Musculoskeletal ROS: negative  Physical Exam  General Appearance: awake, alert, oriented, in no acute distress, well developed, well nourished, and in no acute distress  Skin: there are no suspicious lesions or rashes of concern, skin color, texture, turgor are normal; there are no bruises, rashes or lesions.  Head/Face: NCAT  Eyes: No gross abnormalities., PERRL, and EOMI  Neck: neck- supple, no mass, non-tender  Back: no pain to palpation  Abdomen: Soft, non-tender, normal bowel sounds; no bruits, organomegaly or masses.  Extremities: Extremities  "warm to touch, pink, with no edema.  Musculoskeletal: negative    BP (!) 128/98   Ht 1.575 m (5' 2\")   Wt 74.8 kg (165 lb)   BMI 30.18 kg/m²    Problem List Items Addressed This Visit    None  Visit Diagnoses       Encounter for postpartum visit ([facility])    -  Primary         Annual 3 mths      "

## 2024-07-23 ENCOUNTER — APPOINTMENT (OUTPATIENT)
Dept: OBSTETRICS AND GYNECOLOGY | Facility: CLINIC | Age: 34
End: 2024-07-23
Payer: COMMERCIAL

## 2024-08-01 ENCOUNTER — APPOINTMENT (OUTPATIENT)
Dept: OBSTETRICS AND GYNECOLOGY | Facility: CLINIC | Age: 34
End: 2024-08-01
Payer: COMMERCIAL

## 2024-08-06 ENCOUNTER — HOSPITAL ENCOUNTER (EMERGENCY)
Facility: HOSPITAL | Age: 34
Discharge: HOME | End: 2024-08-06
Attending: PHYSICIAN ASSISTANT
Payer: COMMERCIAL

## 2024-08-06 VITALS
SYSTOLIC BLOOD PRESSURE: 150 MMHG | TEMPERATURE: 97.3 F | HEART RATE: 90 BPM | RESPIRATION RATE: 16 BRPM | BODY MASS INDEX: 32.2 KG/M2 | DIASTOLIC BLOOD PRESSURE: 107 MMHG | OXYGEN SATURATION: 100 % | HEIGHT: 62 IN | WEIGHT: 175 LBS

## 2024-08-06 PROCEDURE — 4500999001 HC ED NO CHARGE

## 2024-08-06 NOTE — ED TRIAGE NOTES
Patient stated she had noticed some swelling redness and pain in her right ear. She stated she had also noted some pain during chewing and swallowing. The patient stated the pain started on Friday.

## 2024-08-07 ENCOUNTER — HOSPITAL ENCOUNTER (EMERGENCY)
Facility: HOSPITAL | Age: 34
Discharge: HOME | End: 2024-08-07
Payer: COMMERCIAL

## 2024-08-07 VITALS
TEMPERATURE: 98.6 F | DIASTOLIC BLOOD PRESSURE: 119 MMHG | HEIGHT: 62 IN | SYSTOLIC BLOOD PRESSURE: 158 MMHG | OXYGEN SATURATION: 99 % | BODY MASS INDEX: 31.28 KG/M2 | RESPIRATION RATE: 18 BRPM | HEART RATE: 80 BPM | WEIGHT: 170 LBS

## 2024-08-07 DIAGNOSIS — H60.501 ACUTE OTITIS EXTERNA OF RIGHT EAR, UNSPECIFIED TYPE: Primary | ICD-10-CM

## 2024-08-07 DIAGNOSIS — H92.01 RIGHT EAR PAIN: ICD-10-CM

## 2024-08-07 PROCEDURE — 99283 EMERGENCY DEPT VISIT LOW MDM: CPT

## 2024-08-07 PROCEDURE — 2500000002 HC RX 250 W HCPCS SELF ADMINISTERED DRUGS (ALT 637 FOR MEDICARE OP, ALT 636 FOR OP/ED): Performed by: SURGERY

## 2024-08-07 PROCEDURE — 2500000001 HC RX 250 WO HCPCS SELF ADMINISTERED DRUGS (ALT 637 FOR MEDICARE OP): Performed by: SURGERY

## 2024-08-07 RX ORDER — CIPROFLOXACIN AND DEXAMETHASONE 3; 1 MG/ML; MG/ML
3 SUSPENSION/ DROPS AURICULAR (OTIC) 2 TIMES DAILY
Status: DISCONTINUED | OUTPATIENT
Start: 2024-08-07 | End: 2024-08-07 | Stop reason: HOSPADM

## 2024-08-07 RX ORDER — ACETAMINOPHEN 325 MG/1
650 TABLET ORAL ONCE
Status: COMPLETED | OUTPATIENT
Start: 2024-08-07 | End: 2024-08-07

## 2024-08-07 RX ORDER — ACETAMINOPHEN 500 MG
1000 TABLET ORAL EVERY 6 HOURS PRN
Qty: 30 TABLET | Refills: 0 | Status: SHIPPED | OUTPATIENT
Start: 2024-08-07 | End: 2024-08-17

## 2024-08-07 RX ORDER — IBUPROFEN 800 MG/1
800 TABLET ORAL 3 TIMES DAILY
Qty: 21 TABLET | Refills: 0 | Status: SHIPPED | OUTPATIENT
Start: 2024-08-07 | End: 2024-08-14

## 2024-08-07 RX ORDER — IBUPROFEN 400 MG/1
800 TABLET ORAL ONCE
Status: COMPLETED | OUTPATIENT
Start: 2024-08-07 | End: 2024-08-07

## 2024-08-07 ASSESSMENT — COLUMBIA-SUICIDE SEVERITY RATING SCALE - C-SSRS
6. HAVE YOU EVER DONE ANYTHING, STARTED TO DO ANYTHING, OR PREPARED TO DO ANYTHING TO END YOUR LIFE?: NO
2. HAVE YOU ACTUALLY HAD ANY THOUGHTS OF KILLING YOURSELF?: NO
1. IN THE PAST MONTH, HAVE YOU WISHED YOU WERE DEAD OR WISHED YOU COULD GO TO SLEEP AND NOT WAKE UP?: NO

## 2024-08-07 ASSESSMENT — PAIN DESCRIPTION - PAIN TYPE: TYPE: ACUTE PAIN

## 2024-08-07 ASSESSMENT — PAIN DESCRIPTION - ORIENTATION: ORIENTATION: RIGHT

## 2024-08-07 ASSESSMENT — PAIN SCALES - GENERAL: PAINLEVEL_OUTOF10: 10 - WORST POSSIBLE PAIN

## 2024-08-07 ASSESSMENT — PAIN DESCRIPTION - LOCATION: LOCATION: EAR

## 2024-08-07 ASSESSMENT — PAIN - FUNCTIONAL ASSESSMENT: PAIN_FUNCTIONAL_ASSESSMENT: 0-10

## 2024-08-07 NOTE — ED PROVIDER NOTES
Chief Complaint   Patient presents with    Earache     HPI:   Isela Alvarado is an 34 y.o. female presenting to the ED with chief complaint of right ear pain.  She explains that her pain started yesterday.  She went to urgent care and was given eardrops which are not helping.  She takes Motrin and Tylenol with minimal relief.  She explains that she does have swelling just anterior to the ear as well.  Endorses external ear pain.  No hearing changes or tinnitus.  No headache or dizziness.  No fever chills or sweats.  Denies sore throat cough or shortness of breath.  No chest pain.  No nausea vomiting diarrhea constipation.    No Known Allergies:  Past Medical History:   Diagnosis Date    Atopic dermatitis 2015    Bacterial vaginosis 10/14/2023    Dysmenorrhea 2005    Lactose intolerance 2009    Other ovarian cyst, unspecified side 2008    Formatting of this note might be different from the original. Noted on exam.  Repeat exam planned in 3 weeks.  Nonpainful on the L.    Postpartum hypertension ([facility]) 10/14/2023    Seasonal allergies 2016    Trichomonas infection 10/14/2023    Yeast vaginitis 10/14/2023     Past Surgical History:   Procedure Laterality Date     SECTION, LOW TRANSVERSE  06/15/2022    2024     No family history on file.     Physical Exam  Vitals and nursing note reviewed.   Constitutional:       General: She is not in acute distress.     Appearance: She is well-developed.   HENT:      Head: Normocephalic and atraumatic.      Right Ear: Tympanic membrane normal. No decreased hearing noted. Tenderness present. No mastoid tenderness.      Left Ear: Tympanic membrane normal.   Eyes:      Conjunctiva/sclera: Conjunctivae normal.   Cardiovascular:      Rate and Rhythm: Normal rate and regular rhythm.      Heart sounds: No murmur heard.  Pulmonary:      Effort: Pulmonary effort is normal. No respiratory distress.      Breath sounds: Normal breath sounds.    Abdominal:      Palpations: Abdomen is soft.      Tenderness: There is no abdominal tenderness.   Musculoskeletal:         General: No swelling.      Cervical back: Neck supple.   Skin:     General: Skin is warm and dry.      Capillary Refill: Capillary refill takes less than 2 seconds.   Neurological:      Mental Status: She is alert.   Psychiatric:         Mood and Affect: Mood normal.        VS: As documented in the triage note and EMR flowsheet from this visit were reviewed.      Medical Decision Making: This is a 34-year-old female presenting to the ED with chief complaint of right ear pain.  She denies symptoms of systemic infection.  Her vitals are reassuring she is afebrile no acute distress speaking full sentences.  Posterior pharynx is wide open.  No swelling.  She does have pain with manipulation of the right auricle and canal narrowing however TM is visible and normal appearing.  Patient was using polymyxin eardrops I will be switching her to Cipro.  You will need to use an ear wick as her canal is very narrowed.  She is treated with Motrin and Tylenol for the pain and these medications were sent to her pharmacy.  She appropriate for outpatient management.   Differential includes otitis media, otitis externa, mastoiditis, abscess    Counseling: Spoke with the patient and discussed today´s findings, in addition to providing specific details for the plan of care and expected course.  Patient was given the opportunity to ask questions.    Discussed return precautions and importance of follow-up.  Advised to follow-up with PCP.  I specifically advised to return to the ED for changing or worsening symptoms, new symptoms, complaint specific precautions, and precautions listed on the discharge paperwork.  Educated on the common potential side effects of medications prescribed.    I advised the patient that the emergency evaluation and treatment provided today doesn't end their need for medical care. It is  very important that they follow-up with their primary care provider or other specialist as instructed.    The plan of care was mutually agreed upon with the patient. The patient and/or family were given the opportunity to ask questions. All questions asked today in the ED were answered to the best of my ability with today's information.    This patient was cared for in the setting of nationwide stress on resources and staffing.    This report was transcribed using voice recognition software.  Every effort was made to ensure accuracy, however, inadvertently computerized transcription errors may be present.         [place] PA-C  08/07/24 0747

## 2024-08-07 NOTE — ED TRIAGE NOTES
Pt to ED from home for right ear pain and swelling. Pt states she left the ED without treatment yesterday and went to urgent care and was started on neomyocin drops. Pt states the pain is worse today and she feels a swollen ball next to her ear. Pt states it is difficult to chew and swallow. Pt denies fevers, aches or chills.

## 2024-08-15 ENCOUNTER — APPOINTMENT (OUTPATIENT)
Dept: OBSTETRICS AND GYNECOLOGY | Facility: CLINIC | Age: 34
End: 2024-08-15
Payer: COMMERCIAL

## 2024-09-24 ENCOUNTER — APPOINTMENT (OUTPATIENT)
Dept: OTOLARYNGOLOGY | Facility: CLINIC | Age: 34
End: 2024-09-24
Payer: COMMERCIAL

## 2024-10-30 ENCOUNTER — APPOINTMENT (OUTPATIENT)
Dept: OTOLARYNGOLOGY | Facility: CLINIC | Age: 34
End: 2024-10-30
Payer: COMMERCIAL

## 2025-01-02 ENCOUNTER — APPOINTMENT (OUTPATIENT)
Dept: OBSTETRICS AND GYNECOLOGY | Facility: CLINIC | Age: 35
End: 2025-01-02
Payer: COMMERCIAL

## 2025-01-07 ENCOUNTER — APPOINTMENT (OUTPATIENT)
Dept: OBSTETRICS AND GYNECOLOGY | Facility: CLINIC | Age: 35
End: 2025-01-07
Payer: COMMERCIAL

## 2025-01-14 ENCOUNTER — APPOINTMENT (OUTPATIENT)
Facility: CLINIC | Age: 35
End: 2025-01-14
Payer: COMMERCIAL

## 2025-01-14 VITALS
DIASTOLIC BLOOD PRESSURE: 105 MMHG | BODY MASS INDEX: 26.46 KG/M2 | WEIGHT: 143.8 LBS | HEIGHT: 62 IN | SYSTOLIC BLOOD PRESSURE: 147 MMHG

## 2025-01-14 DIAGNOSIS — Z01.419 WOMEN'S ANNUAL ROUTINE GYNECOLOGICAL EXAMINATION: Primary | ICD-10-CM

## 2025-01-14 PROCEDURE — 88175 CYTOPATH C/V AUTO FLUID REDO: CPT

## 2025-01-14 PROCEDURE — 1036F TOBACCO NON-USER: CPT | Performed by: OBSTETRICS & GYNECOLOGY

## 2025-01-14 PROCEDURE — 99395 PREV VISIT EST AGE 18-39: CPT | Performed by: OBSTETRICS & GYNECOLOGY

## 2025-01-14 PROCEDURE — 3008F BODY MASS INDEX DOCD: CPT | Performed by: OBSTETRICS & GYNECOLOGY

## 2025-01-14 RX ORDER — PRENATAL VIT NO.126/IRON/FOLIC 28MG-0.8MG
1 TABLET ORAL DAILY
Qty: 90 TABLET | Refills: 3 | Status: SHIPPED | OUTPATIENT
Start: 2025-01-14 | End: 2026-01-14

## 2025-01-14 ASSESSMENT — PATIENT HEALTH QUESTIONNAIRE - PHQ9
2. FEELING DOWN, DEPRESSED OR HOPELESS: NOT AT ALL
1. LITTLE INTEREST OR PLEASURE IN DOING THINGS: NOT AT ALL
SUM OF ALL RESPONSES TO PHQ9 QUESTIONS 1 AND 2: 0

## 2025-01-14 ASSESSMENT — ENCOUNTER SYMPTOMS
LOSS OF SENSATION IN FEET: 0
OCCASIONAL FEELINGS OF UNSTEADINESS: 0

## 2025-01-14 ASSESSMENT — COLUMBIA-SUICIDE SEVERITY RATING SCALE - C-SSRS
1. IN THE PAST MONTH, HAVE YOU WISHED YOU WERE DEAD OR WISHED YOU COULD GO TO SLEEP AND NOT WAKE UP?: NO
2. HAVE YOU ACTUALLY HAD ANY THOUGHTS OF KILLING YOURSELF?: NO
6. HAVE YOU EVER DONE ANYTHING, STARTED TO DO ANYTHING, OR PREPARED TO DO ANYTHING TO END YOUR LIFE?: NO

## 2025-01-14 NOTE — PROGRESS NOTES
Isela Alvarado is a 34 y.o. female who presents with a chief complaint of Annual Exam      SUBJECTIVE  annual    Past Medical History:   Diagnosis Date    Atopic dermatitis 2015    Bacterial vaginosis 10/14/2023    Dysmenorrhea 2005    Lactose intolerance 2009    Other ovarian cyst, unspecified side 2008    Formatting of this note might be different from the original. Noted on exam.  Repeat exam planned in 3 weeks.  Nonpainful on the L.    Postpartum hypertension ([facility]) 10/14/2023    Seasonal allergies 2016    Trichomonas infection 10/14/2023    Yeast vaginitis 10/14/2023     Past Surgical History:   Procedure Laterality Date     SECTION, LOW TRANSVERSE  06/15/2022    2024     Social History     Socioeconomic History    Marital status: Single   Tobacco Use    Smoking status: Never    Smokeless tobacco: Never   Vaping Use    Vaping status: Never Used   Substance and Sexual Activity    Alcohol use: Not Currently    Drug use: Never    Sexual activity: Not Currently     Social Drivers of Health     Financial Resource Strain: Low Risk  (3/8/2024)    Overall Financial Resource Strain (CARDIA)     Difficulty of Paying Living Expenses: Not hard at all   Food Insecurity: No Food Insecurity (3/8/2024)    Hunger Vital Sign     Worried About Running Out of Food in the Last Year: Never true     Ran Out of Food in the Last Year: Never true   Transportation Needs: No Transportation Needs (3/8/2024)    PRAPARE - Transportation     Lack of Transportation (Medical): No     Lack of Transportation (Non-Medical): No   Physical Activity: Sufficiently Active (3/8/2024)    Exercise Vital Sign     Days of Exercise per Week: 5 days     Minutes of Exercise per Session: 50 min   Stress: No Stress Concern Present (3/8/2024)    [employer] [place] [employer] - Occupational Stress Questionnaire     Feeling of Stress : Only a little     No family history on file.    OB History    Para  "Term  AB Living   3 3 2 1   3   SAB IAB Ectopic Multiple Live Births         0 3      # Outcome Date GA Lbr Calin/2nd Weight Sex Type Anes PTL Lv   3  24 36w5d  2.02 kg F CS-LTranv CSE  LEONA      Complications: Abruptio Placenta, Other Excessive Bleeding   2 Term 06/15/22 38w6d  3.147 kg M CS-LTranv EPI N LEONA   1 Term 10/05/12 38w0d  3.289 kg M Vag-Spont  N LEONA       OBJECTIVE  No Known Allergies   (Not in a hospital admission)       Review of Systems  History obtained from the patient  General ROS: negative  Psychological ROS: negative  Gastrointestinal ROS: no abdominal pain, change in bowel habits, or black or bloody stools  Musculoskeletal ROS: negative  Physical Exam  General Appearance: awake, alert, oriented, in no acute distress, well developed, well nourished, and in no acute distress  Skin: there are no suspicious lesions or rashes of concern, skin color, texture, turgor are normal; there are no bruises, rashes or lesions.  Head/Face: NCAT  Eyes: No gross abnormalities., PERRL, and EOMI  Ears: exam deferred  Neck: neck- supple, no mass, non-tender  Back: no pain to palpation  Breast: BREAST EXAM: normal  Abdomen: Soft, non-tender, normal bowel sounds; no bruits, organomegaly or masses.  Extremities: Extremities warm to touch, pink, with no edema.  Musculoskeletal: negative  Urogen: External genitalia: Normal, Vagina: Normal, Cervix: Normal, and Bimanual exam: Normal    BP (!) 147/105   Ht 1.575 m (5' 2\")   Wt 65.2 kg (143 lb 12.8 oz)   LMP 2025 (Exact Date)   Breastfeeding No   BMI 26.30 kg/m²    Problem List Items Addressed This Visit    None  Visit Diagnoses       Women's annual routine gynecological examination    -  Primary    Relevant Medications    prenatal vitamin (Classic Prenatal) 28 mg iron-800 mcg folic acid tablet tablet    Other Relevant Orders    THINPREP PAP               "

## 2025-01-20 ENCOUNTER — APPOINTMENT (OUTPATIENT)
Dept: PODIATRY | Facility: CLINIC | Age: 35
End: 2025-01-20
Payer: COMMERCIAL

## 2025-01-21 ENCOUNTER — APPOINTMENT (OUTPATIENT)
Dept: AUDIOLOGY | Facility: CLINIC | Age: 35
End: 2025-01-21
Payer: COMMERCIAL

## 2025-01-21 ENCOUNTER — APPOINTMENT (OUTPATIENT)
Dept: OTOLARYNGOLOGY | Facility: CLINIC | Age: 35
End: 2025-01-21
Payer: COMMERCIAL

## 2025-01-22 ENCOUNTER — APPOINTMENT (OUTPATIENT)
Dept: PODIATRY | Facility: CLINIC | Age: 35
End: 2025-01-22
Payer: COMMERCIAL

## 2025-01-25 ENCOUNTER — APPOINTMENT (OUTPATIENT)
Dept: PRIMARY CARE | Facility: CLINIC | Age: 35
End: 2025-01-25
Payer: COMMERCIAL

## 2025-01-27 ENCOUNTER — TELEPHONE (OUTPATIENT)
Facility: CLINIC | Age: 35
End: 2025-01-27
Payer: COMMERCIAL

## 2025-01-27 LAB
CYTOLOGY CMNT CVX/VAG CYTO-IMP: NORMAL
LAB AP HPV GENOTYPE QUESTION: YES
LAB AP HPV HR: NORMAL
LABORATORY COMMENT REPORT: NORMAL
LMP START DATE: NORMAL
PATH REPORT.TOTAL CANCER: NORMAL

## 2025-01-27 NOTE — TELEPHONE ENCOUNTER
Patient calling the office demanding to speak with Dr. Negron regarding test results in her chart. She stated that she sent several messages with no response. Patient states she will call the patient advocate if her call is not returned.    English Simpson

## 2025-01-28 ENCOUNTER — APPOINTMENT (OUTPATIENT)
Dept: AUDIOLOGY | Facility: CLINIC | Age: 35
End: 2025-01-28
Payer: COMMERCIAL

## 2025-01-28 ENCOUNTER — APPOINTMENT (OUTPATIENT)
Dept: OTOLARYNGOLOGY | Facility: CLINIC | Age: 35
End: 2025-01-28
Payer: COMMERCIAL

## 2025-02-27 ENCOUNTER — APPOINTMENT (OUTPATIENT)
Facility: CLINIC | Age: 35
End: 2025-02-27
Payer: COMMERCIAL

## 2025-02-27 ENCOUNTER — APPOINTMENT (OUTPATIENT)
Dept: ENDOCRINOLOGY | Facility: CLINIC | Age: 35
End: 2025-02-27
Payer: COMMERCIAL

## 2025-02-27 VITALS
DIASTOLIC BLOOD PRESSURE: 144 MMHG | TEMPERATURE: 97.9 F | HEART RATE: 99 BPM | BODY MASS INDEX: 26.17 KG/M2 | WEIGHT: 142.2 LBS | HEIGHT: 62 IN | SYSTOLIC BLOOD PRESSURE: 187 MMHG

## 2025-02-27 DIAGNOSIS — E04.1 THYROID NODULE: Primary | ICD-10-CM

## 2025-02-27 DIAGNOSIS — I15.9 SECONDARY HYPERTENSION: ICD-10-CM

## 2025-02-27 PROCEDURE — 3077F SYST BP >= 140 MM HG: CPT | Performed by: STUDENT IN AN ORGANIZED HEALTH CARE EDUCATION/TRAINING PROGRAM

## 2025-02-27 PROCEDURE — 3080F DIAST BP >= 90 MM HG: CPT | Performed by: STUDENT IN AN ORGANIZED HEALTH CARE EDUCATION/TRAINING PROGRAM

## 2025-02-27 PROCEDURE — 3008F BODY MASS INDEX DOCD: CPT | Performed by: STUDENT IN AN ORGANIZED HEALTH CARE EDUCATION/TRAINING PROGRAM

## 2025-02-27 PROCEDURE — 1036F TOBACCO NON-USER: CPT | Performed by: STUDENT IN AN ORGANIZED HEALTH CARE EDUCATION/TRAINING PROGRAM

## 2025-02-27 PROCEDURE — 99204 OFFICE O/P NEW MOD 45 MIN: CPT | Performed by: STUDENT IN AN ORGANIZED HEALTH CARE EDUCATION/TRAINING PROGRAM
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
## 2025-02-27 NOTE — PATIENT INSTRUCTIONS
Schedule your thyroid ultrasound    Get your labs done    Follow up in 6 months    Idania Aguirre MD  Divison of Endocrinology   [facility]   Phone: [phone]    option 4, then option 1  Fax: [phone]

## 2025-03-04 ENCOUNTER — APPOINTMENT (OUTPATIENT)
Facility: CLINIC | Age: 35
End: 2025-03-04
Payer: COMMERCIAL

## 2025-03-05 ENCOUNTER — HOSPITAL ENCOUNTER (OUTPATIENT)
Dept: RADIOLOGY | Facility: CLINIC | Age: 35
Discharge: HOME | End: 2025-03-05
Payer: COMMERCIAL

## 2025-03-05 DIAGNOSIS — E04.1 THYROID NODULE: ICD-10-CM

## 2025-03-05 PROCEDURE — 76536 US EXAM OF HEAD AND NECK: CPT

## 2025-03-06 ENCOUNTER — APPOINTMENT (OUTPATIENT)
Facility: CLINIC | Age: 35
End: 2025-03-06
Payer: COMMERCIAL

## 2025-03-06 LAB
ALBUMIN SERPL-MCNC: 4.7 G/DL (ref 3.6–5.1)
ALDOST SERPL-MCNC: NORMAL NG/DL
BUN SERPL-MCNC: 15 MG/DL (ref 7–25)
BUN/CREAT SERPL: NORMAL (CALC) (ref 6–22)
CALCIUM SERPL-MCNC: 9.5 MG/DL (ref 8.6–10.2)
CHLORIDE SERPL-SCNC: 100 MMOL/L (ref 98–110)
CO2 SERPL-SCNC: 28 MMOL/L (ref 20–32)
CREAT SERPL-MCNC: 0.72 MG/DL (ref 0.5–0.97)
EGFRCR SERPLBLD CKD-EPI 2021: 112 ML/MIN/1.73M2
GLUCOSE SERPL-MCNC: 90 MG/DL (ref 65–99)
PHOSPHATE SERPL-MCNC: 4.2 MG/DL (ref 2.5–4.5)
POTASSIUM SERPL-SCNC: 3.9 MMOL/L (ref 3.5–5.3)
RENIN PLAS-CCNC: NORMAL NG/ML/H
SODIUM SERPL-SCNC: 136 MMOL/L (ref 135–146)
T3 SERPL-MCNC: 127 NG/DL (ref 76–181)
T4 FREE SERPL-MCNC: 1.2 NG/DL (ref 0.8–1.8)
TSH SERPL-ACNC: 1.02 MIU/L

## 2025-03-12 DIAGNOSIS — E04.1 THYROID NODULE: Primary | ICD-10-CM

## 2025-03-14 LAB
ALBUMIN SERPL-MCNC: 4.7 G/DL (ref 3.6–5.1)
ALDOST SERPL-MCNC: 8 NG/DL
BUN SERPL-MCNC: 15 MG/DL (ref 7–25)
BUN/CREAT SERPL: NORMAL (CALC) (ref 6–22)
CALCIUM SERPL-MCNC: 9.5 MG/DL (ref 8.6–10.2)
CHLORIDE SERPL-SCNC: 100 MMOL/L (ref 98–110)
CO2 SERPL-SCNC: 28 MMOL/L (ref 20–32)
CREAT SERPL-MCNC: 0.72 MG/DL (ref 0.5–0.97)
EGFRCR SERPLBLD CKD-EPI 2021: 112 ML/MIN/1.73M2
GLUCOSE SERPL-MCNC: 90 MG/DL (ref 65–99)
PHOSPHATE SERPL-MCNC: 4.2 MG/DL (ref 2.5–4.5)
POTASSIUM SERPL-SCNC: 3.9 MMOL/L (ref 3.5–5.3)
RENIN PLAS-CCNC: 2.75 NG/ML/H (ref 0.25–5.82)
SODIUM SERPL-SCNC: 136 MMOL/L (ref 135–146)
T3 SERPL-MCNC: 127 NG/DL (ref 76–181)
T4 FREE SERPL-MCNC: 1.2 NG/DL (ref 0.8–1.8)
TSH SERPL-ACNC: 1.02 MIU/L

## 2025-04-22 ENCOUNTER — APPOINTMENT (OUTPATIENT)
Dept: RADIOLOGY | Facility: HOSPITAL | Age: 35
End: 2025-04-22
Payer: COMMERCIAL

## 2025-04-23 ENCOUNTER — APPOINTMENT (OUTPATIENT)
Dept: PODIATRY | Facility: CLINIC | Age: 35
End: 2025-04-23
Payer: COMMERCIAL

## 2025-05-15 ENCOUNTER — APPOINTMENT (OUTPATIENT)
Dept: ENDOCRINOLOGY | Facility: CLINIC | Age: 35
End: 2025-05-15
Payer: COMMERCIAL

## 2025-05-19 ENCOUNTER — APPOINTMENT (OUTPATIENT)
Dept: OBSTETRICS AND GYNECOLOGY | Facility: CLINIC | Age: 35
End: 2025-05-19
Payer: COMMERCIAL

## 2025-05-28 ENCOUNTER — APPOINTMENT (OUTPATIENT)
Dept: PODIATRY | Facility: CLINIC | Age: 35
End: 2025-05-28
Payer: COMMERCIAL

## 2025-07-16 ENCOUNTER — APPOINTMENT (OUTPATIENT)
Dept: PODIATRY | Facility: CLINIC | Age: 35
End: 2025-07-16
Payer: COMMERCIAL

## 2025-08-13 ENCOUNTER — APPOINTMENT (OUTPATIENT)
Dept: PODIATRY | Facility: CLINIC | Age: 35
End: 2025-08-13
Payer: COMMERCIAL

## 2025-08-28 ENCOUNTER — APPOINTMENT (OUTPATIENT)
Dept: ENDOCRINOLOGY | Facility: CLINIC | Age: 35
End: 2025-08-28
Payer: COMMERCIAL

## (undated) DEVICE — SUTURE, PDS II, 0, 60 IN, CTX, VIOLET

## (undated) DEVICE — SUTURE, VICRYL, 4-0, 27 IN, KS, UNDYED

## (undated) DEVICE — SUTURE, PDS II, 2-0, 27 IN, CT-1 VIL MONO, LF

## (undated) DEVICE — SUTURE, MONOCRYL, 2-0, 36 IN, CTX, VIOLET

## (undated) DEVICE — DRAPE PACK, CESAREAN SECTION, CUSTOM, UHC